# Patient Record
Sex: FEMALE | Race: BLACK OR AFRICAN AMERICAN | NOT HISPANIC OR LATINO | Employment: FULL TIME | URBAN - METROPOLITAN AREA
[De-identification: names, ages, dates, MRNs, and addresses within clinical notes are randomized per-mention and may not be internally consistent; named-entity substitution may affect disease eponyms.]

---

## 2017-03-21 ENCOUNTER — OFFICE VISIT (OUTPATIENT)
Dept: INTERNAL MEDICINE | Facility: CLINIC | Age: 52
End: 2017-03-21
Attending: INTERNAL MEDICINE
Payer: MEDICAID

## 2017-03-21 VITALS
HEIGHT: 62 IN | HEART RATE: 72 BPM | WEIGHT: 170.63 LBS | TEMPERATURE: 98 F | BODY MASS INDEX: 31.4 KG/M2 | SYSTOLIC BLOOD PRESSURE: 133 MMHG | DIASTOLIC BLOOD PRESSURE: 76 MMHG

## 2017-03-21 DIAGNOSIS — R01.1 HEART MURMUR: ICD-10-CM

## 2017-03-21 DIAGNOSIS — Z83.3 FAMILY HISTORY OF DIABETES MELLITUS: ICD-10-CM

## 2017-03-21 DIAGNOSIS — N93.9 VAGINAL SPOTTING: ICD-10-CM

## 2017-03-21 DIAGNOSIS — Z00.00 HEALTHCARE MAINTENANCE: Primary | ICD-10-CM

## 2017-03-21 DIAGNOSIS — R01.1 MURMUR, CARDIAC: ICD-10-CM

## 2017-03-21 PROCEDURE — 99999 PR PBB SHADOW E&M-EST. PATIENT-LVL III: CPT | Mod: PBBFAC,,, | Performed by: STUDENT IN AN ORGANIZED HEALTH CARE EDUCATION/TRAINING PROGRAM

## 2017-03-21 PROCEDURE — 99213 OFFICE O/P EST LOW 20 MIN: CPT | Mod: PBBFAC,PO | Performed by: STUDENT IN AN ORGANIZED HEALTH CARE EDUCATION/TRAINING PROGRAM

## 2017-03-21 PROCEDURE — 99204 OFFICE O/P NEW MOD 45 MIN: CPT | Mod: S$PBB,,, | Performed by: STUDENT IN AN ORGANIZED HEALTH CARE EDUCATION/TRAINING PROGRAM

## 2017-03-21 RX ORDER — HYDROCODONE BITARTRATE AND ACETAMINOPHEN 5; 325 MG/1; MG/1
1 TABLET ORAL EVERY 6 HOURS PRN
COMMUNITY
End: 2018-04-19

## 2017-03-21 RX ORDER — TRAMADOL HYDROCHLORIDE 50 MG/1
50 TABLET ORAL EVERY 6 HOURS PRN
COMMUNITY
End: 2018-04-19

## 2017-03-21 RX ORDER — ALPRAZOLAM 0.5 MG/1
0.5 TABLET ORAL DAILY PRN
COMMUNITY
End: 2018-04-19

## 2017-03-22 ENCOUNTER — LAB VISIT (OUTPATIENT)
Dept: LAB | Facility: HOSPITAL | Age: 52
End: 2017-03-22
Attending: INTERNAL MEDICINE
Payer: MEDICAID

## 2017-03-22 DIAGNOSIS — Z00.00 HEALTHCARE MAINTENANCE: ICD-10-CM

## 2017-03-22 LAB
CHOLEST/HDLC SERPL: 2.8 {RATIO}
HDL/CHOLESTEROL RATIO: 36.1 %
HDLC SERPL-MCNC: 202 MG/DL
HDLC SERPL-MCNC: 73 MG/DL
LDLC SERPL CALC-MCNC: 107.6 MG/DL
NONHDLC SERPL-MCNC: 129 MG/DL
TRIGL SERPL-MCNC: 107 MG/DL

## 2017-03-22 PROCEDURE — 36415 COLL VENOUS BLD VENIPUNCTURE: CPT

## 2017-03-22 PROCEDURE — 80061 LIPID PANEL: CPT

## 2017-03-23 ENCOUNTER — OFFICE VISIT (OUTPATIENT)
Dept: OBSTETRICS AND GYNECOLOGY | Facility: CLINIC | Age: 52
End: 2017-03-23
Payer: MEDICAID

## 2017-03-23 ENCOUNTER — HOSPITAL ENCOUNTER (OUTPATIENT)
Dept: RADIOLOGY | Facility: OTHER | Age: 52
Discharge: HOME | End: 2017-03-23
Attending: OBSTETRICS & GYNECOLOGY
Payer: MEDICAID

## 2017-03-23 VITALS
SYSTOLIC BLOOD PRESSURE: 140 MMHG | WEIGHT: 167.56 LBS | DIASTOLIC BLOOD PRESSURE: 80 MMHG | BODY MASS INDEX: 30.83 KG/M2 | HEIGHT: 62 IN

## 2017-03-23 DIAGNOSIS — Z01.419 ROUTINE GYNECOLOGICAL EXAMINATION: Primary | ICD-10-CM

## 2017-03-23 DIAGNOSIS — Z01.419 ROUTINE GYNECOLOGICAL EXAMINATION: ICD-10-CM

## 2017-03-23 DIAGNOSIS — N93.9 VAGINA BLEEDING: ICD-10-CM

## 2017-03-23 DIAGNOSIS — N94.10 DYSPAREUNIA, FEMALE: ICD-10-CM

## 2017-03-23 DIAGNOSIS — Z12.31 VISIT FOR SCREENING MAMMOGRAM: ICD-10-CM

## 2017-03-23 DIAGNOSIS — N95.1 MENOPAUSE SYNDROME: ICD-10-CM

## 2017-03-23 DIAGNOSIS — R10.2 PELVIC PAIN IN FEMALE: ICD-10-CM

## 2017-03-23 PROCEDURE — 77067 SCR MAMMO BI INCL CAD: CPT | Mod: TC

## 2017-03-23 PROCEDURE — 99386 PREV VISIT NEW AGE 40-64: CPT | Mod: S$PBB,,, | Performed by: OBSTETRICS & GYNECOLOGY

## 2017-03-23 PROCEDURE — 77063 BREAST TOMOSYNTHESIS BI: CPT | Mod: 26,,, | Performed by: RADIOLOGY

## 2017-03-23 PROCEDURE — 99999 PR PBB SHADOW E&M-EST. PATIENT-LVL III: CPT | Mod: PBBFAC,,, | Performed by: OBSTETRICS & GYNECOLOGY

## 2017-03-23 PROCEDURE — 99213 OFFICE O/P EST LOW 20 MIN: CPT | Mod: PBBFAC | Performed by: OBSTETRICS & GYNECOLOGY

## 2017-03-23 PROCEDURE — 77067 SCR MAMMO BI INCL CAD: CPT | Mod: 26,,, | Performed by: RADIOLOGY

## 2017-03-23 RX ORDER — ESTRADIOL 0.1 MG/G
1 CREAM VAGINAL DAILY
Qty: 45 G | Refills: 12 | Status: SHIPPED | OUTPATIENT
Start: 2017-03-23 | End: 2018-04-19

## 2017-03-27 ENCOUNTER — HOSPITAL ENCOUNTER (OUTPATIENT)
Dept: RADIOLOGY | Facility: OTHER | Age: 52
Discharge: HOME | End: 2017-03-27
Attending: OBSTETRICS & GYNECOLOGY
Payer: MEDICAID

## 2017-03-27 DIAGNOSIS — R10.2 PELVIC PAIN IN FEMALE: ICD-10-CM

## 2017-03-27 PROCEDURE — 76830 TRANSVAGINAL US NON-OB: CPT | Mod: 26,,, | Performed by: RADIOLOGY

## 2017-03-27 PROCEDURE — 76856 US EXAM PELVIC COMPLETE: CPT | Mod: 26,,, | Performed by: RADIOLOGY

## 2017-03-27 PROCEDURE — 76856 US EXAM PELVIC COMPLETE: CPT | Mod: TC

## 2017-03-27 NOTE — PROGRESS NOTES
03/27/17 U/S  Findings: There are no abnormal masses, fluid collections or blood flow within the pelvis in this patient status post total hysterectomy.           Impression              This examination is normal.            PT HERE FOR ANNUAL.  CLAIMS TO HAVE HAD VAGINAL BLEEDING S/P HYSTERECTOMY.  PAIN WITH INTERCOURSE AND VAGINAL DRYNESS.    ROS:  GENERAL: No fever, chills, fatigability or weight loss.  VULVAR: No pain, no lesions and no itching.  VAGINAL: No relaxation, no itching, no discharge, no abnormal bleeding and no lesions.  ABDOMEN: No abdominal pain. Denies nausea. Denies vomiting. No diarrhea. No constipation  BREAST: Denies pain. No lumps. No discharge.  URINARY: No incontinence, no nocturia, no frequency and no dysuria.  CARDIOVASCULAR: No chest pain. No shortness of breath. No leg cramps.  NEUROLOGICAL: No headaches. No vision changes.  The remainder of the review of systems was negative.    PE:   General Appearance: overweight Well developed. Well nourished. In no acute distress.  Urethral Meatus: Normal size. Normal location. No lesions. No prolapse.  Vulva: Atrophic. Lesions: No.  Urethra: No masses. No tenderness. No prolapse. No scarring.  Bladder: No masses. No tenderness.  Vagina: Mucosa NI: yes Discharge: no Atrophic: yes Rectocele: no Cystocele: no Vaginal cuff intact: yes  Cervix: Absent.  Uterus: Absent.  Adnexa: Masses: No Tenderness: No       CDS Nodularity: No   Abdomen: overweight  No masses. No tenderness.  Breasts: No bilateral masses. No bilateral discharge. No bilateral tenderness. No bilateral fibrocystic changes.  Neck: No thyroid enlargement. No thyroid masses.  Skin: Rashes: No    PROCEDURES:    DIAGNOSIS:  1. Routine gynecological examination    2. Menopause syndrome    3. Vagina bleeding    4. Pelvic pain in female    5. Dyspareunia, female        PLAN:     MEDICATIONS & ORDERS:  Orders Placed This Encounter    US Pelvis Comp with Transvag NON-OB (xpd    estradiol  (ESTRACE) 0.01 % (0.1 mg/gram) vaginal cream    estrogens, conjugated, (PREMARIN) 0.625 MG tablet       Patient was counseled today on the new ACS guidelines for cervical cytology screening as well as the current recommendations for breast cancer screening. She was counseled to follow up with her PCP for other routine health maintenance. Counseling session lasted approximately 10 minutes, and all her questions were answered.         FOLLOW-UP: With me in 12 month

## 2017-03-30 DIAGNOSIS — Z86.010 ENCOUNTER FOR COLONOSCOPY DUE TO HISTORY OF COLONIC POLYP: Primary | ICD-10-CM

## 2017-03-30 DIAGNOSIS — Z12.11 ENCOUNTER FOR COLONOSCOPY DUE TO HISTORY OF COLONIC POLYP: Primary | ICD-10-CM

## 2017-03-30 RX ORDER — POLYETHYLENE GLYCOL 3350, SODIUM SULFATE ANHYDROUS, SODIUM BICARBONATE, SODIUM CHLORIDE, POTASSIUM CHLORIDE 236; 22.74; 6.74; 5.86; 2.97 G/4L; G/4L; G/4L; G/4L; G/4L
4 POWDER, FOR SOLUTION ORAL ONCE
Qty: 4000 ML | Refills: 0 | Status: SHIPPED | OUTPATIENT
Start: 2017-03-30 | End: 2017-03-30

## 2017-05-02 ENCOUNTER — TELEPHONE (OUTPATIENT)
Dept: INTERNAL MEDICINE | Facility: CLINIC | Age: 52
End: 2017-05-02

## 2017-05-02 NOTE — TELEPHONE ENCOUNTER
I tried to call Ms. Desean Poole many times to inform he about the results of her lipid profile. No answer. I left a message in her voice mail stated that her lipid profile is comparable to the previous one 8 years ago. I calculated her ASCVD for the next 10 years for cardiovascular events and it was 2.2%. No need to start her on statin for the time being. Will re-evaluate the life style advice provided to her in the last visit and re measure her lipid profile based on screening guidelines.

## 2017-05-10 ENCOUNTER — TELEPHONE (OUTPATIENT)
Dept: INTERNAL MEDICINE | Facility: CLINIC | Age: 52
End: 2017-05-10

## 2017-06-12 ENCOUNTER — TELEPHONE (OUTPATIENT)
Dept: ENDOSCOPY | Facility: HOSPITAL | Age: 52
End: 2017-06-12

## 2017-06-26 PROBLEM — Z00.00 HEALTHCARE MAINTENANCE: Status: RESOLVED | Noted: 2017-03-21 | Resolved: 2017-06-26

## 2018-04-19 ENCOUNTER — HOSPITAL ENCOUNTER (EMERGENCY)
Facility: HOSPITAL | Age: 53
End: 2018-04-20
Attending: EMERGENCY MEDICINE | Admitting: COLON & RECTAL SURGERY
Payer: MEDICAID

## 2018-04-19 DIAGNOSIS — K62.89 RECTAL PAIN: Primary | ICD-10-CM

## 2018-04-19 DIAGNOSIS — K64.9 HEMORRHOIDS, UNSPECIFIED HEMORRHOID TYPE: ICD-10-CM

## 2018-04-19 DIAGNOSIS — K60.2 ANAL FISSURE: ICD-10-CM

## 2018-04-19 LAB
ALBUMIN SERPL BCP-MCNC: 3.9 G/DL
ALP SERPL-CCNC: 111 U/L
ALT SERPL W/O P-5'-P-CCNC: 19 U/L
ANION GAP SERPL CALC-SCNC: 9 MMOL/L
AST SERPL-CCNC: 16 U/L
BASOPHILS # BLD AUTO: 0.02 K/UL
BASOPHILS NFR BLD: 0.3 %
BILIRUB SERPL-MCNC: 0.4 MG/DL
BILIRUB UR QL STRIP: NEGATIVE
BUN SERPL-MCNC: 19 MG/DL
CALCIUM SERPL-MCNC: 9.7 MG/DL
CHLORIDE SERPL-SCNC: 100 MMOL/L
CLARITY UR: CLEAR
CO2 SERPL-SCNC: 28 MMOL/L
COLOR UR: YELLOW
CREAT SERPL-MCNC: 1.1 MG/DL
CRP SERPL-MCNC: 7.6 MG/L
DIFFERENTIAL METHOD: ABNORMAL
EOSINOPHIL # BLD AUTO: 0.1 K/UL
EOSINOPHIL NFR BLD: 2.1 %
ERYTHROCYTE [DISTWIDTH] IN BLOOD BY AUTOMATED COUNT: 11.8 %
ERYTHROCYTE [SEDIMENTATION RATE] IN BLOOD BY WESTERGREN METHOD: 39 MM/HR
EST. GFR  (AFRICAN AMERICAN): >60 ML/MIN/1.73 M^2
EST. GFR  (NON AFRICAN AMERICAN): 57 ML/MIN/1.73 M^2
GLUCOSE SERPL-MCNC: 102 MG/DL
GLUCOSE UR QL STRIP: NEGATIVE
HCT VFR BLD AUTO: 38 %
HGB BLD-MCNC: 12.6 G/DL
HGB UR QL STRIP: ABNORMAL
KETONES UR QL STRIP: NEGATIVE
LEUKOCYTE ESTERASE UR QL STRIP: NEGATIVE
LYMPHOCYTES # BLD AUTO: 2.8 K/UL
LYMPHOCYTES NFR BLD: 41.3 %
MCH RBC QN AUTO: 31.7 PG
MCHC RBC AUTO-ENTMCNC: 33.2 G/DL
MCV RBC AUTO: 96 FL
MONOCYTES # BLD AUTO: 0.4 K/UL
MONOCYTES NFR BLD: 5.2 %
NEUTROPHILS # BLD AUTO: 3.4 K/UL
NEUTROPHILS NFR BLD: 51 %
NITRITE UR QL STRIP: NEGATIVE
PH UR STRIP: 6 [PH] (ref 5–8)
PLATELET # BLD AUTO: 238 K/UL
PMV BLD AUTO: 10.5 FL
POTASSIUM SERPL-SCNC: 3.9 MMOL/L
PROT SERPL-MCNC: 7.8 G/DL
PROT UR QL STRIP: NEGATIVE
RBC # BLD AUTO: 3.97 M/UL
SODIUM SERPL-SCNC: 137 MMOL/L
SP GR UR STRIP: 1.01 (ref 1–1.03)
URN SPEC COLLECT METH UR: ABNORMAL
UROBILINOGEN UR STRIP-ACNC: ABNORMAL EU/DL
WBC # BLD AUTO: 6.7 K/UL

## 2018-04-19 PROCEDURE — 25500020 PHARM REV CODE 255: Performed by: EMERGENCY MEDICINE

## 2018-04-19 PROCEDURE — 80053 COMPREHEN METABOLIC PANEL: CPT

## 2018-04-19 PROCEDURE — 85025 COMPLETE CBC W/AUTO DIFF WBC: CPT

## 2018-04-19 PROCEDURE — 63600175 PHARM REV CODE 636 W HCPCS: Performed by: PHYSICIAN ASSISTANT

## 2018-04-19 PROCEDURE — 81003 URINALYSIS AUTO W/O SCOPE: CPT

## 2018-04-19 PROCEDURE — 85651 RBC SED RATE NONAUTOMATED: CPT

## 2018-04-19 PROCEDURE — 86140 C-REACTIVE PROTEIN: CPT

## 2018-04-19 RX ORDER — VENLAFAXINE 75 MG/1
75 TABLET ORAL 2 TIMES DAILY
COMMUNITY

## 2018-04-19 RX ORDER — LISINOPRIL AND HYDROCHLOROTHIAZIDE 20; 25 MG/1; MG/1
1 TABLET ORAL DAILY
COMMUNITY

## 2018-04-19 RX ORDER — MORPHINE SULFATE 10 MG/ML
4 INJECTION INTRAMUSCULAR; INTRAVENOUS; SUBCUTANEOUS
Status: COMPLETED | OUTPATIENT
Start: 2018-04-19 | End: 2018-04-19

## 2018-04-19 RX ORDER — TRAZODONE HYDROCHLORIDE 50 MG/1
50 TABLET ORAL NIGHTLY
COMMUNITY

## 2018-04-19 RX ADMIN — MORPHINE SULFATE 4 MG: 10 INJECTION INTRAVENOUS at 10:04

## 2018-04-19 RX ADMIN — IOHEXOL 75 ML: 350 INJECTION, SOLUTION INTRAVENOUS at 09:04

## 2018-04-20 VITALS
DIASTOLIC BLOOD PRESSURE: 51 MMHG | WEIGHT: 160 LBS | HEART RATE: 71 BPM | BODY MASS INDEX: 30.21 KG/M2 | TEMPERATURE: 99 F | HEIGHT: 61 IN | RESPIRATION RATE: 16 BRPM | OXYGEN SATURATION: 100 % | SYSTOLIC BLOOD PRESSURE: 102 MMHG

## 2018-04-20 PROCEDURE — 99284 EMERGENCY DEPT VISIT MOD MDM: CPT | Mod: 25

## 2018-04-20 PROCEDURE — 99284 EMERGENCY DEPT VISIT MOD MDM: CPT | Mod: ,,, | Performed by: EMERGENCY MEDICINE

## 2018-04-20 PROCEDURE — 96376 TX/PRO/DX INJ SAME DRUG ADON: CPT

## 2018-04-20 PROCEDURE — 63600175 PHARM REV CODE 636 W HCPCS: Performed by: EMERGENCY MEDICINE

## 2018-04-20 PROCEDURE — 96374 THER/PROPH/DIAG INJ IV PUSH: CPT

## 2018-04-20 RX ORDER — MORPHINE SULFATE 4 MG/ML
4 INJECTION, SOLUTION INTRAMUSCULAR; INTRAVENOUS
Status: COMPLETED | OUTPATIENT
Start: 2018-04-20 | End: 2018-04-20

## 2018-04-20 RX ADMIN — MORPHINE SULFATE 4 MG: 4 INJECTION INTRAVENOUS at 02:04

## 2018-04-20 NOTE — ED PROVIDER NOTES
Encounter Date: 2018       History     Chief Complaint   Patient presents with    Hemorrhoids     Pt c/o hemorrhoid pain and bleeding x 3 weeks     Patient is a 52-year-old female with history of hypertension who presents to the emergency department with rectal pain.  Patient states over the last 3 weeks she has had a throbbing sensation in her rectum.  She states over the last 2 days the pain has become very severe.  She rates her pain 10 out of 10.  She states the area is burning and throbbing.  She states she cannot sit down.  She states she believes she has hemorrhoids.  She states she had a soft bowel movement today that did worsen her pain.  She denies fevers or chills.  She denies drainage.  She reports blood with wiping her rectum.  She denies nausea or vomiting.  She denies abdominal pain.  She denies diabetes or immunocompromised state.      The history is provided by the patient.     Review of patient's allergies indicates:   Allergen Reactions    Codeine Itching     Can take percocet or vicodin. Pt reports last time she took it she didn't itch.     Past Medical History:   Diagnosis Date    GERD (gastroesophageal reflux disease)     Hypertension      Past Surgical History:   Procedure Laterality Date    BREAST SURGERY      bilateral reduction     SECTION      X 1    CHOLECYSTECTOMY  2015    HYSTERECTOMY      JESÚS     Family History   Problem Relation Age of Onset    Heart attack Mother     Diabetes Mother     Colon cancer Mother     Stroke Father     Hypertension Father     Colon cancer Father     Hypertension Sister     Ovarian cancer Cousin     Breast cancer Neg Hx      Social History   Substance Use Topics    Smoking status: Never Smoker    Smokeless tobacco: Never Used    Alcohol use 1.2 oz/week     2 Cans of beer per week     Review of Systems   Constitutional: Negative for activity change, appetite change, chills and fever.   HENT: Negative for congestion, ear  discharge, ear pain, postnasal drip, rhinorrhea, sore throat and trouble swallowing.    Respiratory: Negative for cough and shortness of breath.    Cardiovascular: Negative for chest pain.   Gastrointestinal: Positive for anal bleeding and rectal pain. Negative for abdominal pain, blood in stool, constipation, diarrhea, nausea and vomiting.   Genitourinary: Negative for dysuria, flank pain and hematuria.   Musculoskeletal: Negative for back pain, neck pain and neck stiffness.   Skin: Negative for rash and wound.   Neurological: Negative for dizziness, syncope, speech difficulty, weakness, light-headedness, numbness and headaches.       Physical Exam     Initial Vitals [04/19/18 1828]   BP Pulse Resp Temp SpO2   112/67 86 18 97.2 °F (36.2 °C) 99 %      MAP       82         Physical Exam    Nursing note and vitals reviewed.  Constitutional: She appears well-developed and well-nourished. She is not diaphoretic.  Non-toxic appearance. No distress.   Crying in pain   HENT:   Head: Normocephalic.   Right Ear: Hearing and external ear normal.   Left Ear: Hearing and external ear normal.   Nose: Nose normal.   Mouth/Throat: Uvula is midline, oropharynx is clear and moist and mucous membranes are normal. No oropharyngeal exudate.   Eyes: Conjunctivae are normal. Pupils are equal, round, and reactive to light.   Neck: Normal range of motion.   Cardiovascular: Normal rate and regular rhythm.   Pulmonary/Chest: Breath sounds normal.   Abdominal: Soft. Bowel sounds are normal. There is no tenderness. There is no rebound, no CVA tenderness, no tenderness at McBurney's point and negative Tamayo's sign.   Genitourinary: Rectal exam shows external hemorrhoid (not thrombosed), fissure and guaiac positive stool. Rectal exam shows no mass. Guaiac positive stool. : Acceptable.  Genitourinary Comments: Brown stool in vault.  Rectal pain on exam with no induration or fluctuance.  No drainage.   Lymphadenopathy:     She  has no cervical adenopathy.   Neurological: She is alert and oriented to person, place, and time.   Skin: Skin is warm and dry. Capillary refill takes less than 2 seconds.   Psychiatric: She has a normal mood and affect.         ED Course   Procedures  Labs Reviewed   CBC W/ AUTO DIFFERENTIAL - Abnormal; Notable for the following:        Result Value    RBC 3.97 (*)     MCH 31.7 (*)     All other components within normal limits   COMPREHENSIVE METABOLIC PANEL - Abnormal; Notable for the following:     eGFR if non  57 (*)     All other components within normal limits   C-REACTIVE PROTEIN   SEDIMENTATION RATE, MANUAL   URINALYSIS         Imaging Results          CT Pelvis With Contrast (Final result)  Result time 04/19/18 21:42:07    Final result by Tino Rae MD (04/19/18 21:42:07)                 Impression:      1. Thickening of the perianal walls, for the most part low attenuating, may reflect edema, no focal organized collection to suggest abscess.  Correlation with physical exam recommended.  2. Several additional findings above.      Electronically signed by: Tino Rae MD  Date:    04/19/2018  Time:    21:42             Narrative:    EXAMINATION:  CT PELVIS WITH  CONTRAST    CLINICAL HISTORY:  rectal pain;    TECHNIQUE:  Axial images of the pelvis were obtained at 2.5 mm intervals following administration of 75 cc omni 350 IV contrast.  Coronal, sagittal, and delayed images were reviewed.    COMPARISON:  None    FINDINGS:  The right kidney enhances appropriately and excrete contrast appropriately.  The bilateral ureters are grossly unremarkable without calculi seen.  The urinary bladder is unremarkable.  The uterus is absent the adnexa is grossly unremarkable.  There are scattered colonic diverticula without inflammation to suggest diverticulitis.  The appendix and terminal ileum are grossly unremarkable.  The small bowel is grossly unremarkable.  No focal organized pelvic fluid  collection.  No acute osseous abnormality noting degenerative changes of the lumbar spine.    There is mild thickening/edema of the anal walls, without significant surrounding induration or focal organized fluid collection.  No rectal wall thickening.  No subcutaneous organized collection.  No significant inguinal lymphadenopathy.  Postsurgical changes are noted of the anterior pelvic wall.                                     Medical Decision Making:   Initial Assessment:   Urgent evaluation of a 52-year-old female with history of hypertension who presents to the emergency department with rectal pain.  Patient is afebrile, nontoxic appearing, and hemodynamically stable.  Patient is crying on exam.  There is a small external hemorrhoid that is nonthrombosed.  Patient has small fissure.  On rectal exam, patient is an excruciating pain.  She is crying.  There is no induration, fluctuance, or rectal mass noted.  There is brown stool in rectal vault that is guaiac positive.  I am concerned for rectal mass/abscess.  CT scan will be obtained.  Labs will be obtained.  Patient will be given pain control.  Clinical Tests:   Lab Tests: Ordered and Reviewed  Radiological Study: Ordered and Reviewed  ED Management:  10:19 PM  Labs reveal no leukocytosis.  No significant elevation in ESR or CRP.  CT scan shows findings of thickening of the perianal walls which reflects edema with no focal organized collection.  Case is discussed with GI who advised IV antibiotics and transfer to [facility] for colorectal surgery.  Have spoken with transfer center.    10:33 PM  Pt will be transferred ED to ED for colorectal eval.  He advised no IV antibiotics at this time.  Other:   I have discussed this case with another health care provider.       <> Summary of the Discussion: Dr. Horne                      Clinical Impression:   The primary encounter diagnosis was Rectal pain. A diagnosis of Hemorrhoids, unspecified hemorrhoid type was also  pertinent to this visit.                           Keshia Reyes PA-C  04/19/18 [id number]

## 2018-04-20 NOTE — ED NOTES
Spoke with Kassandra at [facility], accepting physician is Shahida, report to be called ED to ED @ [phone]     Transport secured by [facility]

## 2018-04-20 NOTE — ED PROVIDER NOTES
Encounter Date: 2018       History     Chief Complaint   Patient presents with    Transfer - [facility]     Pt transferred from [facility] for evaluation of rectal pain and swelling.      52-year-old female with a history of GERD presents to the emergency department with complaints of rectal pain.  The patient was transferred from [facility] emergency department to receive an evaluation by colorectal surgery for acute rectal pain.  Patient reports that the pain began 2 weeks prior to arrival in worsened throughout that time.  Pain as a 10 out of 10 intensity, nonradiating, exacerbated with direct palpation, minimally alleviated with rest and is throbbing in character.          Review of patient's allergies indicates:   Allergen Reactions    Codeine Itching     Can take percocet or vicodin. Pt reports last time she took it she didn't itch.     Past Medical History:   Diagnosis Date    GERD (gastroesophageal reflux disease)     Hypertension      Past Surgical History:   Procedure Laterality Date    BREAST SURGERY      bilateral reduction     SECTION      X 1    CHOLECYSTECTOMY  2015    HYSTERECTOMY      JESÚS     Family History   Problem Relation Age of Onset    Heart attack Mother     Diabetes Mother     Colon cancer Mother     Stroke Father     Hypertension Father     Colon cancer Father     Hypertension Sister     Ovarian cancer Cousin     Breast cancer Neg Hx      Social History   Substance Use Topics    Smoking status: Never Smoker    Smokeless tobacco: Never Used    Alcohol use 1.2 oz/week     2 Cans of beer per week     Review of Systems   Constitutional: Negative for activity change and appetite change.   HENT: Negative for facial swelling.    Eyes: Negative for pain.   Respiratory: Negative for shortness of breath.    Cardiovascular: Negative for chest pain.   Gastrointestinal: Positive for anal bleeding, diarrhea and rectal pain.   Genitourinary: Negative for dysuria.    Musculoskeletal: Negative for arthralgias and back pain.   Skin: Negative for color change.   Neurological: Negative for dizziness and numbness.   Psychiatric/Behavioral: Negative for confusion.   All other systems reviewed and are negative.      Physical Exam     Initial Vitals [04/19/18 1828]   BP Pulse Resp Temp SpO2   112/67 86 18 97.2 °F (36.2 °C) 99 %      MAP       82         Physical Exam    Constitutional: Vital signs are normal. She appears well-developed and well-nourished.   HENT:   Head: Normocephalic and atraumatic.   Eyes: Conjunctivae, EOM and lids are normal. Pupils are equal, round, and reactive to light.   Neck: Normal range of motion. Neck supple.   Cardiovascular: Normal rate, regular rhythm, S1 normal, S2 normal, normal heart sounds, intact distal pulses and normal pulses.   Pulmonary/Chest: Breath sounds normal.   Abdominal: Soft. Normal appearance and bowel sounds are normal.   Genitourinary:   Genitourinary Comments: Tenderness surrounding the rectal region  Anal fissure present.  No signs of gross blood.   Musculoskeletal: Normal range of motion.   Neurological: She is alert and oriented to person, place, and time. No cranial nerve deficit or sensory deficit.   Skin: Skin is warm and dry. Capillary refill takes less than 2 seconds.   Psychiatric: She has a normal mood and affect. Her speech is normal and behavior is normal. Judgment normal.       HO2    The pt is a 52-year-old female transferred for rectal pain   DDx includes but is not limited to anal fissure, constipation, internal hemorrhoid, external hemorrhoid, diverticulitis     Basic labs, pain medication, CT of the abdomen and pelvis with contrast were ordered prior to the patient's arrival.  CT scan show mild inflammation possibly consistent with diverticulitis.  Once the patient arrived consult it the colorectal excepting service and administered an additional dose of medication for pain.  Vitals are stable and patient was alert  and oriented without any focal deficits.  Kingsley Johns M.D  3:46 AM    HO2 UPDATE    Colorectal surgery evaluated the patient at bedside and confirmed a diagnosis of anal fissure.  Is instructed to continue sitz bath, prescribed a topical calcium channel blocker and instructed to follow up in clinic within the following 2-3 weeks.  Patient was also given strict return precautions prior to discharge.  I will stable mental status remained unchanged.  Kingsley Johns MD  4:30 AM 4/20/2018      ED Course   Procedures  Labs Reviewed   CBC W/ AUTO DIFFERENTIAL - Abnormal; Notable for the following:        Result Value    RBC 3.97 (*)     MCH 31.7 (*)     All other components within normal limits   COMPREHENSIVE METABOLIC PANEL - Abnormal; Notable for the following:     eGFR if non  57 (*)     All other components within normal limits   SEDIMENTATION RATE, MANUAL - Abnormal; Notable for the following:     Sed Rate 39 (*)     All other components within normal limits   URINALYSIS - Abnormal; Notable for the following:     Occult Blood UA Trace (*)     Urobilinogen, UA 2.0-3.0 (*)     All other components within normal limits   C-REACTIVE PROTEIN                                  Clinical Impression:   The primary encounter diagnosis was Rectal pain. Diagnoses of Hemorrhoids, unspecified hemorrhoid type and Anal fissure were also pertinent to this visit.                           Kingsley Johns MD  Resident  04/20/18 [id number]

## 2018-04-20 NOTE — ED NOTES
NEURO: Pt AAO x 4. Behavior and speech appropriate to situation.   CARDIAC: Regular rhythm auscultated  RESPIRATORY: Respirations even and unlabored.    MUSCULOSKELETAL: Active ROM noted to extremities  GASTRO: + tender to palpation

## 2018-04-20 NOTE — CONSULTS
History & Physical  Surgery      SUBJECTIVE:     Chief Complaint/Reason for Admission: anal fissure    History of Present Illness: Desean Poole is a 52 y.o. female with a history of hypertension who presents to the emergency department with rectal pain.  Patient states over the last 3 weeks she has had a throbbing sensation in her rectum.  She states over the last 2 days the pain has become very severe.  She rates her pain 10 out of 10.  She states the area is burning and throbbing.  She states she cannot sit down.  She states she had a soft bowel movement today that did worsen her pain.  She denies fevers or chills.  She denies drainage.  She reports blood with wiping her rectum.  She denies nausea or vomiting.  She denies abdominal pain.  CT pelvis showed mild anal edema but no other abnormalities.      No current facility-administered medications on file prior to encounter.      No current outpatient prescriptions on file prior to encounter.       Review of patient's allergies indicates:   Allergen Reactions    Codeine Itching     Can take percocet or vicodin. Pt reports last time she took it she didn't itch.       Past Medical History:   Diagnosis Date    GERD (gastroesophageal reflux disease)     Hypertension      Past Surgical History:   Procedure Laterality Date    BREAST SURGERY      bilateral reduction     SECTION      X 1    CHOLECYSTECTOMY  2015    HYSTERECTOMY      JESÚS     Family History   Problem Relation Age of Onset    Heart attack Mother     Diabetes Mother     Colon cancer Mother     Stroke Father     Hypertension Father     Colon cancer Father     Hypertension Sister     Ovarian cancer Cousin     Breast cancer Neg Hx      Social History   Substance Use Topics    Smoking status: Never Smoker    Smokeless tobacco: Never Used    Alcohol use 1.2 oz/week     2 Cans of beer per week        Review of Systems   Constitutional: Negative for chills, fatigue and fever.   HENT:  Negative for congestion and rhinorrhea.    Eyes: Negative for visual disturbance.   Respiratory: Negative for cough and shortness of breath.    Cardiovascular: Negative for chest pain and leg swelling.   Gastrointestinal: Positive for anal bleeding. Negative for abdominal pain, constipation, diarrhea, nausea and vomiting.        Anal pain   Endocrine: Negative for polyuria.   Genitourinary: Negative for dysuria.   Musculoskeletal: Negative for arthralgias and myalgias.   Skin: Negative for wound.   Neurological: Negative for dizziness, light-headedness and headaches.   Psychiatric/Behavioral: Negative for agitation.     OBJECTIVE:     Vital Signs (Most Recent)  Temp: 98.5 °F (36.9 °C) (04/20/18 0115)  Pulse: 72 (04/20/18 0231)  Resp: 16 (04/20/18 0115)  BP: (!) 109/57 (04/20/18 0231)  SpO2: 100 % (04/20/18 0231)    Physical Exam   Constitutional: She is oriented to person, place, and time. She appears well-developed and well-nourished. No distress.   HENT:   Head: Normocephalic and atraumatic.   Eyes: Conjunctivae and EOM are normal. Pupils are equal, round, and reactive to light.   Neck: Normal range of motion. Neck supple. No tracheal deviation present.   Cardiovascular: Normal rate, regular rhythm and normal heart sounds.    Pulmonary/Chest: Effort normal and breath sounds normal.   Abdominal: Soft. Bowel sounds are normal.   Genitourinary:   Genitourinary Comments: Posterior midline anal fissure adjacent to perianal skin tag.  MISA/anoscopy deferred due to pain.   Musculoskeletal: Normal range of motion. She exhibits no edema.   Neurological: She is alert and oriented to person, place, and time.   Skin: Skin is warm and dry.   Psychiatric: She has a normal mood and affect.     Laboratory  CBC:   Recent Labs  Lab 04/19/18 2036   WBC 6.70   RBC 3.97*   HGB 12.6   HCT 38.0      MCV 96   MCH 31.7*   MCHC 33.2     CMP:   Recent Labs  Lab 04/19/18 2036      CALCIUM 9.7   ALBUMIN 3.9   PROT 7.8       K 3.9   CO2 28      BUN 19   CREATININE 1.1   ALKPHOS 111   ALT 19   AST 16   BILITOT 0.4       Diagnostic Results:  CT: Reviewed    ASSESSMENT/PLAN:     A/P:  51 yo F with posterior midline anal fissure    1. SOAK TID in tub warm water only (or hand held shower to anus)  2. Diltiazem cream TID  3.  High fiber diet - 25 grams per day.  Emphasis on whole grain breads such as double fiber wheat bread and high fiber cereals and bars.    Drink 8 glasses of water per day 64 - 96 oz per day  Fiber supplements such as KONSYL, METAMUCIL can be taken 1-2 x per day  Regular exercise - 30 min brisk walking 5 days per week  4.  RTC 3 weeks.    5.  Begin miralax daily prn    Jose Frausto  General Surgery, PGY-5  Pager # [phone]

## 2018-04-20 NOTE — ED TRIAGE NOTES
"Pt presents with hemorrhoid pain, onset 3 weeks ago. Pt states "Bleeding, stinging, burning, cant sit, cant walk". Pain rates 10/10. Pt reports "gobs of blood" with stooling, pt reports stools are brown with bright red blood. Pt also reports RUQ pain. Preparation H with and without aloe and anusol without relief. Pt reports she has colonoscopy on 24th. Pt sits with leaning to one side. Pt in NAD.   "

## 2019-11-09 ENCOUNTER — HOSPITAL ENCOUNTER (EMERGENCY)
Facility: OTHER | Age: 54
Discharge: HOME | End: 2019-11-09
Attending: EMERGENCY MEDICINE
Payer: MEDICAID

## 2019-11-09 VITALS
HEART RATE: 67 BPM | SYSTOLIC BLOOD PRESSURE: 136 MMHG | BODY MASS INDEX: 30.23 KG/M2 | TEMPERATURE: 99 F | RESPIRATION RATE: 16 BRPM | DIASTOLIC BLOOD PRESSURE: 85 MMHG | WEIGHT: 160 LBS | OXYGEN SATURATION: 100 %

## 2019-11-09 DIAGNOSIS — R10.9 RIGHT FLANK PAIN: ICD-10-CM

## 2019-11-09 DIAGNOSIS — R10.9 ABDOMINAL PAIN, UNSPECIFIED ABDOMINAL LOCATION: Primary | ICD-10-CM

## 2019-11-09 LAB
ALBUMIN SERPL BCP-MCNC: 4 G/DL (ref 3.5–5.2)
ALP SERPL-CCNC: 87 U/L (ref 55–135)
ALT SERPL W/O P-5'-P-CCNC: 21 U/L (ref 10–44)
ANION GAP SERPL CALC-SCNC: 11 MMOL/L (ref 8–16)
AST SERPL-CCNC: 19 U/L (ref 10–40)
BASOPHILS # BLD AUTO: 0.04 K/UL (ref 0–0.2)
BASOPHILS NFR BLD: 0.4 % (ref 0–1.9)
BILIRUB SERPL-MCNC: 1 MG/DL (ref 0.1–1)
BILIRUB UR QL STRIP: NEGATIVE
BUN SERPL-MCNC: 13 MG/DL (ref 6–20)
CALCIUM SERPL-MCNC: 9.2 MG/DL (ref 8.7–10.5)
CHLORIDE SERPL-SCNC: 104 MMOL/L (ref 95–110)
CLARITY UR: CLEAR
CO2 SERPL-SCNC: 25 MMOL/L (ref 23–29)
COLOR UR: YELLOW
CREAT SERPL-MCNC: 1 MG/DL (ref 0.5–1.4)
DIFFERENTIAL METHOD: NORMAL
EOSINOPHIL # BLD AUTO: 0.1 K/UL (ref 0–0.5)
EOSINOPHIL NFR BLD: 1 % (ref 0–8)
ERYTHROCYTE [DISTWIDTH] IN BLOOD BY AUTOMATED COUNT: 12.1 % (ref 11.5–14.5)
EST. GFR  (AFRICAN AMERICAN): >60 ML/MIN/1.73 M^2
EST. GFR  (NON AFRICAN AMERICAN): >60 ML/MIN/1.73 M^2
GLUCOSE SERPL-MCNC: 113 MG/DL (ref 70–110)
GLUCOSE UR QL STRIP: NEGATIVE
HCT VFR BLD AUTO: 39 % (ref 37–48.5)
HGB BLD-MCNC: 13.1 G/DL (ref 12–16)
HGB UR QL STRIP: ABNORMAL
IMM GRANULOCYTES # BLD AUTO: 0.04 K/UL (ref 0–0.04)
IMM GRANULOCYTES NFR BLD AUTO: 0.4 % (ref 0–0.5)
KETONES UR QL STRIP: NEGATIVE
LEUKOCYTE ESTERASE UR QL STRIP: NEGATIVE
LIPASE SERPL-CCNC: 18 U/L (ref 4–60)
LYMPHOCYTES # BLD AUTO: 2.9 K/UL (ref 1–4.8)
LYMPHOCYTES NFR BLD: 30.4 % (ref 18–48)
MCH RBC QN AUTO: 31 PG (ref 27–31)
MCHC RBC AUTO-ENTMCNC: 33.6 G/DL (ref 32–36)
MCV RBC AUTO: 92 FL (ref 82–98)
MONOCYTES # BLD AUTO: 0.6 K/UL (ref 0.3–1)
MONOCYTES NFR BLD: 6.7 % (ref 4–15)
NEUTROPHILS # BLD AUTO: 5.8 K/UL (ref 1.8–7.7)
NEUTROPHILS NFR BLD: 61.1 % (ref 38–73)
NITRITE UR QL STRIP: NEGATIVE
NRBC BLD-RTO: 0 /100 WBC
PH UR STRIP: 7 [PH] (ref 5–8)
PLATELET # BLD AUTO: 230 K/UL (ref 150–350)
PMV BLD AUTO: 10.8 FL (ref 9.2–12.9)
POTASSIUM SERPL-SCNC: 3.9 MMOL/L (ref 3.5–5.1)
PROT SERPL-MCNC: 7.5 G/DL (ref 6–8.4)
PROT UR QL STRIP: NEGATIVE
RBC # BLD AUTO: 4.22 M/UL (ref 4–5.4)
SODIUM SERPL-SCNC: 140 MMOL/L (ref 136–145)
SP GR UR STRIP: 1.02 (ref 1–1.03)
URN SPEC COLLECT METH UR: ABNORMAL
UROBILINOGEN UR STRIP-ACNC: NEGATIVE EU/DL
WBC # BLD AUTO: 9.42 K/UL (ref 3.9–12.7)

## 2019-11-09 PROCEDURE — 99284 EMERGENCY DEPT VISIT MOD MDM: CPT | Mod: 25

## 2019-11-09 PROCEDURE — 85025 COMPLETE CBC W/AUTO DIFF WBC: CPT

## 2019-11-09 PROCEDURE — 96361 HYDRATE IV INFUSION ADD-ON: CPT

## 2019-11-09 PROCEDURE — 83690 ASSAY OF LIPASE: CPT

## 2019-11-09 PROCEDURE — 80053 COMPREHEN METABOLIC PANEL: CPT

## 2019-11-09 PROCEDURE — 81003 URINALYSIS AUTO W/O SCOPE: CPT

## 2019-11-09 PROCEDURE — 63600175 PHARM REV CODE 636 W HCPCS: Performed by: PHYSICIAN ASSISTANT

## 2019-11-09 PROCEDURE — 96374 THER/PROPH/DIAG INJ IV PUSH: CPT

## 2019-11-09 RX ORDER — KETOROLAC TROMETHAMINE 30 MG/ML
10 INJECTION, SOLUTION INTRAMUSCULAR; INTRAVENOUS
Status: COMPLETED | OUTPATIENT
Start: 2019-11-09 | End: 2019-11-09

## 2019-11-09 RX ORDER — NAPROXEN 500 MG/1
500 TABLET ORAL 2 TIMES DAILY WITH MEALS
Qty: 20 TABLET | Refills: 0 | Status: SHIPPED | OUTPATIENT
Start: 2019-11-09 | End: 2022-04-02

## 2019-11-09 RX ADMIN — SODIUM CHLORIDE 1000 ML: 0.9 INJECTION, SOLUTION INTRAVENOUS at 03:11

## 2019-11-09 RX ADMIN — KETOROLAC TROMETHAMINE 10 MG: 30 INJECTION, SOLUTION INTRAMUSCULAR; INTRAVENOUS at 03:11

## 2019-11-09 NOTE — ED PROVIDER NOTES
Encounter Date: 2019       History     Chief Complaint   Patient presents with    Abdominal Pain     right-sided abd pain x 2 days. Denies N/V/D or urinary s/s. Pt does admit episode of feeling faint yesterday     Urgent evaluation a 54-year-old female presenting to the emergency room with complaints of right upper quadrant abdominal pain and flank pain.  Patient states her symptoms started last night and have been constant since.  She denies any associated nausea, vomiting, diarrhea a dysuria.  No fever chills. No medication use thus far.  She states that she has had similar symptoms in the past, they are concerned had some ED with her liver, no known liver problems.  She is not a daily drinker.  No drug use.This is the extent of the patient's complaints at this time.       The history is provided by the patient.     Review of patient's allergies indicates:   Allergen Reactions    Codeine Itching     Can take percocet or vicodin. Pt reports last time she took it she didn't itch.     Past Medical History:   Diagnosis Date    GERD (gastroesophageal reflux disease)     Hypertension      Past Surgical History:   Procedure Laterality Date    BREAST SURGERY      bilateral reduction     SECTION      X 1    CHOLECYSTECTOMY  2015    HYSTERECTOMY      JESÚS     Family History   Problem Relation Age of Onset    Heart attack Mother     Diabetes Mother     Colon cancer Mother     Stroke Father     Hypertension Father     Colon cancer Father     Hypertension Sister     Ovarian cancer Cousin     Breast cancer Neg Hx      Social History     Tobacco Use    Smoking status: Never Smoker    Smokeless tobacco: Never Used   Substance Use Topics    Alcohol use: Yes     Alcohol/week: 2.0 standard drinks     Types: 2 Cans of beer per week    Drug use: No     Review of Systems   Constitutional: Negative for activity change, appetite change, chills, fatigue and fever.   HENT: Negative for congestion,  rhinorrhea and sore throat.    Eyes: Negative for photophobia and visual disturbance.   Respiratory: Negative for cough, shortness of breath and wheezing.    Cardiovascular: Negative for chest pain.   Gastrointestinal: Positive for abdominal pain. Negative for diarrhea, nausea and vomiting.   Genitourinary: Positive for flank pain. Negative for dysuria, hematuria and urgency.   Musculoskeletal: Negative for back pain, myalgias and neck pain.   Skin: Negative for color change and wound.   Neurological: Negative for weakness and headaches.   Psychiatric/Behavioral: Negative for agitation and confusion.       Physical Exam     Initial Vitals [11/09/19 1452]   BP Pulse Resp Temp SpO2   (!) 143/65 77 16 98.6 °F (37 °C) 99 %      MAP       --         Physical Exam    Nursing note and vitals reviewed.  Constitutional: She appears well-developed and well-nourished. She is not diaphoretic. No distress.   Well-appearing,  female accompanied the emergency room.  Speaking clearly full sentences.  No acute distress.   HENT:   Head: Normocephalic and atraumatic.   Right Ear: External ear normal.   Left Ear: External ear normal.   Nose: Nose normal.   Mouth/Throat: Oropharynx is clear and moist.   Eyes: Conjunctivae and EOM are normal.   Neck: Normal range of motion. Neck supple.   Cardiovascular: Normal rate, regular rhythm and normal heart sounds.   Pulmonary/Chest: Breath sounds normal. No respiratory distress. She has no wheezes.   Abdominal: Soft. Bowel sounds are normal. She exhibits no distension. There is tenderness in the right upper quadrant. There is CVA tenderness (Right-sided). There is no rebound and no guarding.   Musculoskeletal: Normal range of motion.   Neurological: She is alert and oriented to person, place, and time. GCS eye subscore is 4. GCS verbal subscore is 5. GCS motor subscore is 6.   Skin: Skin is warm.   Psychiatric: She has a normal mood and affect. Her behavior is normal. Judgment  and thought content normal.         ED Course   Procedures  Labs Reviewed   COMPREHENSIVE METABOLIC PANEL - Abnormal; Notable for the following components:       Result Value    Glucose 113 (*)     All other components within normal limits   URINALYSIS, REFLEX TO URINE CULTURE - Abnormal; Notable for the following components:    Occult Blood UA Trace (*)     All other components within normal limits    Narrative:     Preferred Collection Type->Urine, Clean Catch   CBC W/ AUTO DIFFERENTIAL   LIPASE          Imaging Results          CT Renal Stone Study ABD Pelvis WO (Final result)  Result time 11/09/19 18:06:23    Final result by Tequila Camacho MD (11/09/19 18:06:23)                 Impression:      1. No acute intra-abdominal abnormalities identified.  No evidence of stones or obstructive uropathy.  2. Postsurgical changes of cholecystectomy and hysterectomy.      Electronically signed by: Tequila Camacho MD  Date:    11/09/2019  Time:    18:06             Narrative:    EXAMINATION:  CT RENAL STONE STUDY ABD PELVIS WO    CLINICAL HISTORY:  Hematuria;    TECHNIQUE:  Low dose axial images, sagittal and coronal reformations were obtained from the lung bases to the pubic symphysis.  Contrast was not administered.    COMPARISON:  None    FINDINGS:  The visualized portion of the heart is unremarkable.  The lung bases are clear.    No significant hepatic abnormality seen on this noncontrast exam.  There is no intra-or extrahepatic biliary ductal dilatation.  Gallbladder has been removed.  The stomach, pancreas, spleen, and adrenal glands are unremarkable.    Kidneys show no evidence of stones or hydronephrosis. Ureters are difficult to track, however no stones are seen along their expected courses.  Urinary bladder is nondistended.  Uterus has been removed.    Appendix is visualized and is unremarkable.  The visualized loops of small and large bowel show no evidence of obstruction or inflammation.  No free air or free  fluid.    Aorta tapers normally.    No acute osseous abnormality identified. Subcutaneous soft tissue structures are unremarkable.                                 Medical Decision Making:   Initial Assessment:     Urgent evaluation of a 54 y.o. female with a past medical history of HTN, presenting to the emergency department complaining of right sided abdominal pain and flank pain. Patient is afebrile, nontoxic appearing and hemodynamically stable. Physical exam reveals mild tenderness to palpation right upper quadrant right flank with no rebound or guarding.  Will plan for labs, analgesics and reassess.    Clinical Tests:   Lab Tests: Ordered and Reviewed  ED Management:    CBC shows no leukocytosis, normal H&H.  CMP is unremarkable, normal electrolytes, urine creatinine, LFTs.  Lipase is normal.  UA is unremarkable for hematuria.  Will plan for CT renal stone.    CT is unremarkable.  No clear etiology for the patient's signs or symptoms.  At this point, no evidence of acute bacterial infection require more testing or antibiotic treatment.  Patient be treated with naproxen, counseled on home care and treatment.  Discharged stable condition.The patient was instructed to follow up with a primary care provider in 2 days or to return to the emergency department for worsening symptoms. The treatment plan was discussed with the patient who demonstrated understanding and comfort with plan.    This note was created using [employer] Direct. There may be typographical errors secondary to dictation.                                    Clinical Impression:     1. Abdominal pain, unspecified abdominal location    2. Right flank pain        Disposition:   Disposition: Discharged  Condition: Stable                     Joy Oconnor PA-C  11/09/19 1910

## 2019-11-09 NOTE — ED TRIAGE NOTES
Pt to ER with c/o right lower abdominal pain radiating towards the back x 2 days. Pt denies any UA symptoms, or trauma. Pt denies CP, weakness, SOB, N/V

## 2019-11-22 NOTE — PROGRESS NOTES
Subjective:       Patient ID: Desean Poole is a 51 y.o. female.    Chief Complaint: Establish Care    HPI     This is Ms. Desean Poole, 51 y.o., patient of Dr. Heather Otto MD, with last visit of 3/21/17, presented to the primary care clinic to Establish Care. She overall noticed that she has blurred vision, for the last three months, she noticed no change in her reading and not associated with day or night. 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 Blevins prescribed her pain and anxiety (Norco and Xanax) medication since 2016. She noticed some symptosm of lower urinary tracts infection including . She also noticed some dyspnea on exertion with functional class III on more than usual physical activity. Denies orthopnea, PND, chest pain, lower limb swelling, or loss of consciousness. She had colonscopy around 6 years that revealed some polys according to patient (not documented). She endorsed that she was having hypertension previously, and she was taking Clonidine. On this visit, she has normal blood pressure reading that was measured twice 133/76 and my measurement 135-70 mmHg.     Problem List:  Patient Active Problem List   Diagnosis    Disease of gallbladder    Shingles outbreak       Past Medical History:   Past Medical History:   Diagnosis Date    GERD (gastroesophageal reflux disease)     Hypertension         Past Surgical History:   Past Surgical History:   Procedure Laterality Date    BREAST SURGERY      bilateral reduction     SECTION      HYSTERECTOMY         Medication History:  Current Outpatient Prescriptions on File Prior to Visit   Medication Sig Dispense Refill    [DISCONTINUED] acyclovir (ZOVIRAX) 200 MG capsule Take 1 capsule (200 mg total) by mouth 5 (five) times daily. 150 capsule 11    [DISCONTINUED] cloNIDine (CATAPRES) 0.1 MG tablet Take 0.05 mg by mouth every  evening.       [DISCONTINUED] omeprazole (PRILOSEC) 40 MG capsule Take 40 mg by mouth daily as needed.      [DISCONTINUED] ondansetron (ZOFRAN ODT) 4 MG TbDL Take 1 tablet (4 mg total) by mouth every 8 (eight) hours as needed (Nausea). 12 tablet 0    [DISCONTINUED] oxycodone (ROXICODONE) 10 mg Tab immediate release tablet Take 1 tablet (10 mg total) by mouth every 4 (four) hours as needed. 40 tablet 0    [DISCONTINUED] oxycodone-acetaminophen (PERCOCET) 5-325 mg per tablet Take 1 tablet by mouth every 6 (six) hours as needed for Pain. 12 tablet 0    [DISCONTINUED] oxycodone-acetaminophen (PERCOCET) 7.5-325 mg per tablet Take 1 tablet by mouth every 4 (four) hours as needed for Pain. 20 tablet 0    [DISCONTINUED] silver sulfADIAZINE 1% (SILVADENE) 1 % cream Apply topically 2 (two) times daily. 400 g 1    [DISCONTINUED] zolpidem (AMBIEN) 5 MG Tab Take 1 tablet (5 mg total) by mouth nightly as needed. 10 tablet 0     No current facility-administered medications on file prior to visit.        Allergy:   Review of patient's allergies indicates:   Allergen Reactions    Codeine      Can take percocet or vicodin       Social Hx:  Social History   Substance Use Topics    Smoking status: Never Smoker    Smokeless tobacco: Never Used    Alcohol use 1.2 oz/week     2 Cans of beer per week       Family Hx:  Family History   Problem Relation Age of Onset    Heart attack Mother     Diabetes Mother     Stroke Father     Hypertension Father     Hypertension Sister           Review of Systems   Constitutional: Positive for fatigue. Negative for activity change, appetite change, chills, diaphoresis and fever.   HENT: Negative for congestion, dental problem, drooling, ear discharge and ear pain.    Eyes: Negative for pain, discharge and itching.   Respiratory: Positive for shortness of breath (Class III NYHA). Negative for cough, choking, chest tightness, wheezing and stridor.    Cardiovascular: Negative for chest pain,  palpitations and leg swelling.   Gastrointestinal: Negative for abdominal distention, abdominal pain, anal bleeding, constipation and diarrhea.   Genitourinary: Negative for difficulty urinating and dysuria.        Vaginal spotting (pinkish)    Musculoskeletal: Negative for arthralgias and back pain.        Right arm numbness and tingling sensation (from her trauma) and following Dr. Blevins for that.    Skin: Negative for color change and pallor.   Neurological: Negative for weakness.   Psychiatric/Behavioral: Negative for agitation and behavioral problems.       Objective:  Vitals:    03/21/17 1424   BP: 133/76   Pulse: 72   Temp: 97.8 °F (36.6 °C)           Physical Exam   Constitutional: She is oriented to person, place, and time. She appears well-developed and well-nourished. No distress.   HENT:   Head: Normocephalic and atraumatic.   Right Ear: External ear normal.   Left Ear: External ear normal.   Eyes: EOM are normal. Pupils are equal, round, and reactive to light. Right eye exhibits no discharge. Left eye exhibits no discharge.   Neck: Normal range of motion. Neck supple. No JVD present. No tracheal deviation present. No thyromegaly present.   Cardiovascular: Normal rate.    Murmur (systolic murmur) heard.  Pulmonary/Chest: Effort normal and breath sounds normal.   Abdominal: Soft. Bowel sounds are normal. She exhibits no distension.   Musculoskeletal: Normal range of motion. She exhibits no edema or deformity.   Neurological: She is alert and oriented to person, place, and time.   Skin: She is not diaphoretic.       Assessment:       1. Healthcare maintenance    2. Vaginal spotting    3. Family history of diabetes mellitus    4. Murmur, cardiac    5. Heart murmur        Plan:         Desean was seen today for establish care.    Diagnoses and all orders for this visit:    Healthcare maintenance  -     CBC auto differential; Standing  -     Comprehensive metabolic panel; Standing  -     LIPID PANEL;  Future  -     CBC auto differential  -     Comprehensive metabolic panel  - Hepatitis C Antibody   -     Case request GI: COLONOSCOPY    Vaginal spotting   -     URINALYSIS   -     Will defer her Mammogram and Pap smear to her ObGyn appointment this week.   -     CBC auto differential; Standing  Family history of diabetes mellitus  -     Hemoglobin A1c; Future    Murmur, cardiac  -     Echo doppler color flow; Future      Ms. Desean Poole was seen, discussed and examined with Staff Primary Care physician Dr. Zacarias. Return to clinic on 3 months.     Josh Scruggs MD  Internal Medicine Resident (PGY-I)  Pager: [phone]     no indicators present

## 2021-01-29 ENCOUNTER — HOSPITAL ENCOUNTER (OUTPATIENT)
Facility: OTHER | Age: 56
Discharge: HOME | End: 2021-01-31
Attending: INTERNAL MEDICINE | Admitting: INTERNAL MEDICINE
Payer: MEDICAID

## 2021-01-29 DIAGNOSIS — R07.9 ACUTE CHEST PAIN: ICD-10-CM

## 2021-01-29 DIAGNOSIS — R07.89 NON-CARDIAC CHEST PAIN: Primary | ICD-10-CM

## 2021-01-29 DIAGNOSIS — I10 ESSENTIAL HYPERTENSION: ICD-10-CM

## 2021-01-29 DIAGNOSIS — N17.9 AKI (ACUTE KIDNEY INJURY): ICD-10-CM

## 2021-01-29 DIAGNOSIS — I10 HYPERTENSION, UNSPECIFIED TYPE: ICD-10-CM

## 2021-01-29 DIAGNOSIS — R07.9 CHEST PAIN: ICD-10-CM

## 2021-01-29 DIAGNOSIS — G43.009 MIGRAINE WITHOUT AURA AND WITHOUT STATUS MIGRAINOSUS, NOT INTRACTABLE: ICD-10-CM

## 2021-01-29 LAB
ALBUMIN SERPL BCP-MCNC: 4.5 G/DL (ref 3.5–5.2)
ALP SERPL-CCNC: 137 U/L (ref 55–135)
ALT SERPL W/O P-5'-P-CCNC: 92 U/L (ref 10–44)
ANION GAP SERPL CALC-SCNC: 12 MMOL/L (ref 8–16)
AST SERPL-CCNC: 29 U/L (ref 10–40)
BASOPHILS # BLD AUTO: 0.04 K/UL (ref 0–0.2)
BASOPHILS NFR BLD: 0.7 % (ref 0–1.9)
BILIRUB SERPL-MCNC: 0.8 MG/DL (ref 0.1–1)
BILIRUB UR QL STRIP: NEGATIVE
BNP SERPL-MCNC: <10 PG/ML (ref 0–99)
BUN SERPL-MCNC: 14 MG/DL (ref 6–20)
CALCIUM SERPL-MCNC: 10.2 MG/DL (ref 8.7–10.5)
CHLORIDE SERPL-SCNC: 94 MMOL/L (ref 95–110)
CLARITY UR: ABNORMAL
CO2 SERPL-SCNC: 29 MMOL/L (ref 23–29)
COLOR UR: YELLOW
CREAT SERPL-MCNC: 1.6 MG/DL (ref 0.5–1.4)
CTP QC/QA: YES
CTP QC/QA: YES
DIFFERENTIAL METHOD: ABNORMAL
EOSINOPHIL # BLD AUTO: 0.3 K/UL (ref 0–0.5)
EOSINOPHIL NFR BLD: 4.2 % (ref 0–8)
ERYTHROCYTE [DISTWIDTH] IN BLOOD BY AUTOMATED COUNT: 12.1 % (ref 11.5–14.5)
EST. GFR  (AFRICAN AMERICAN): 41 ML/MIN/1.73 M^2
EST. GFR  (NON AFRICAN AMERICAN): 36 ML/MIN/1.73 M^2
GLUCOSE SERPL-MCNC: 110 MG/DL (ref 70–110)
GLUCOSE UR QL STRIP: NEGATIVE
HCT VFR BLD AUTO: 43.7 % (ref 37–48.5)
HGB BLD-MCNC: 14.4 G/DL (ref 12–16)
HGB UR QL STRIP: NEGATIVE
IMM GRANULOCYTES # BLD AUTO: 0.03 K/UL (ref 0–0.04)
IMM GRANULOCYTES NFR BLD AUTO: 0.5 % (ref 0–0.5)
KETONES UR QL STRIP: NEGATIVE
LEUKOCYTE ESTERASE UR QL STRIP: NEGATIVE
LIPASE SERPL-CCNC: 29 U/L (ref 4–60)
LYMPHOCYTES # BLD AUTO: 1.5 K/UL (ref 1–4.8)
LYMPHOCYTES NFR BLD: 24.8 % (ref 18–48)
MCH RBC QN AUTO: 31.3 PG (ref 27–31)
MCHC RBC AUTO-ENTMCNC: 33 G/DL (ref 32–36)
MCV RBC AUTO: 95 FL (ref 82–98)
MONOCYTES # BLD AUTO: 0.8 K/UL (ref 0.3–1)
MONOCYTES NFR BLD: 13.2 % (ref 4–15)
NEUTROPHILS # BLD AUTO: 3.3 K/UL (ref 1.8–7.7)
NEUTROPHILS NFR BLD: 56.6 % (ref 38–73)
NITRITE UR QL STRIP: NEGATIVE
NRBC BLD-RTO: 0 /100 WBC
PH UR STRIP: 6 [PH] (ref 5–8)
PLATELET # BLD AUTO: 252 K/UL (ref 150–350)
PLATELET BLD QL SMEAR: ABNORMAL
PMV BLD AUTO: 10.7 FL (ref 9.2–12.9)
POTASSIUM SERPL-SCNC: 3.6 MMOL/L (ref 3.5–5.1)
PROT SERPL-MCNC: 8.7 G/DL (ref 6–8.4)
PROT UR QL STRIP: NEGATIVE
RBC # BLD AUTO: 4.6 M/UL (ref 4–5.4)
SARS-COV-2 RDRP RESP QL NAA+PROBE: NEGATIVE
SARS-COV-2 RDRP RESP QL NAA+PROBE: NEGATIVE
SODIUM SERPL-SCNC: 135 MMOL/L (ref 136–145)
SP GR UR STRIP: 1.02 (ref 1–1.03)
TROPONIN I SERPL DL<=0.01 NG/ML-MCNC: 0.01 NG/ML (ref 0–0.03)
TROPONIN I SERPL DL<=0.01 NG/ML-MCNC: 0.01 NG/ML (ref 0–0.03)
URN SPEC COLLECT METH UR: ABNORMAL
UROBILINOGEN UR STRIP-ACNC: NEGATIVE EU/DL
WBC # BLD AUTO: 5.89 K/UL (ref 3.9–12.7)

## 2021-01-29 PROCEDURE — 93005 ELECTROCARDIOGRAM TRACING: CPT

## 2021-01-29 PROCEDURE — 85025 COMPLETE CBC W/AUTO DIFF WBC: CPT

## 2021-01-29 PROCEDURE — 96372 THER/PROPH/DIAG INJ SC/IM: CPT | Mod: 59

## 2021-01-29 PROCEDURE — 93010 EKG 12-LEAD: ICD-10-PCS | Mod: ,,, | Performed by: INTERNAL MEDICINE

## 2021-01-29 PROCEDURE — U0002 COVID-19 LAB TEST NON-CDC: HCPCS | Performed by: EMERGENCY MEDICINE

## 2021-01-29 PROCEDURE — 99220 PR INITIAL OBSERVATION CARE,LEVL III: CPT | Mod: ,,, | Performed by: NURSE PRACTITIONER

## 2021-01-29 PROCEDURE — 93010 ELECTROCARDIOGRAM REPORT: CPT | Mod: ,,, | Performed by: INTERNAL MEDICINE

## 2021-01-29 PROCEDURE — G0378 HOSPITAL OBSERVATION PER HR: HCPCS

## 2021-01-29 PROCEDURE — 63600175 PHARM REV CODE 636 W HCPCS: Performed by: NURSE PRACTITIONER

## 2021-01-29 PROCEDURE — 83880 ASSAY OF NATRIURETIC PEPTIDE: CPT

## 2021-01-29 PROCEDURE — 80053 COMPREHEN METABOLIC PANEL: CPT

## 2021-01-29 PROCEDURE — 99220 PR INITIAL OBSERVATION CARE,LEVL III: ICD-10-PCS | Mod: ,,, | Performed by: NURSE PRACTITIONER

## 2021-01-29 PROCEDURE — 84484 ASSAY OF TROPONIN QUANT: CPT

## 2021-01-29 PROCEDURE — 25000003 PHARM REV CODE 250: Performed by: EMERGENCY MEDICINE

## 2021-01-29 PROCEDURE — 84484 ASSAY OF TROPONIN QUANT: CPT | Mod: 91

## 2021-01-29 PROCEDURE — 96360 HYDRATION IV INFUSION INIT: CPT

## 2021-01-29 PROCEDURE — 25000003 PHARM REV CODE 250: Performed by: NURSE PRACTITIONER

## 2021-01-29 PROCEDURE — 94761 N-INVAS EAR/PLS OXIMETRY MLT: CPT

## 2021-01-29 PROCEDURE — 99285 EMERGENCY DEPT VISIT HI MDM: CPT | Mod: 25

## 2021-01-29 PROCEDURE — 81003 URINALYSIS AUTO W/O SCOPE: CPT

## 2021-01-29 PROCEDURE — U0002 COVID-19 LAB TEST NON-CDC: HCPCS | Performed by: NURSE PRACTITIONER

## 2021-01-29 PROCEDURE — 96361 HYDRATE IV INFUSION ADD-ON: CPT

## 2021-01-29 PROCEDURE — 83690 ASSAY OF LIPASE: CPT

## 2021-01-29 PROCEDURE — 36415 COLL VENOUS BLD VENIPUNCTURE: CPT

## 2021-01-29 RX ORDER — ONDANSETRON 8 MG/1
8 TABLET, ORALLY DISINTEGRATING ORAL EVERY 8 HOURS PRN
Status: DISCONTINUED | OUTPATIENT
Start: 2021-01-29 | End: 2021-01-31 | Stop reason: HOSPADM

## 2021-01-29 RX ORDER — ACETAMINOPHEN 325 MG/1
650 TABLET ORAL EVERY 4 HOURS PRN
Status: DISCONTINUED | OUTPATIENT
Start: 2021-01-29 | End: 2021-01-31 | Stop reason: HOSPADM

## 2021-01-29 RX ORDER — VENLAFAXINE 37.5 MG/1
75 TABLET ORAL 2 TIMES DAILY
Status: DISCONTINUED | OUTPATIENT
Start: 2021-01-29 | End: 2021-01-31 | Stop reason: HOSPADM

## 2021-01-29 RX ORDER — SODIUM CHLORIDE 9 MG/ML
INJECTION, SOLUTION INTRAVENOUS CONTINUOUS
Status: DISCONTINUED | OUTPATIENT
Start: 2021-01-29 | End: 2021-01-29

## 2021-01-29 RX ORDER — ASPIRIN 325 MG
325 TABLET ORAL
Status: COMPLETED | OUTPATIENT
Start: 2021-01-29 | End: 2021-01-29

## 2021-01-29 RX ORDER — ACETAMINOPHEN 500 MG
1000 TABLET ORAL
Status: COMPLETED | OUTPATIENT
Start: 2021-01-29 | End: 2021-01-29

## 2021-01-29 RX ORDER — SODIUM CHLORIDE 0.9 % (FLUSH) 0.9 %
10 SYRINGE (ML) INJECTION
Status: DISCONTINUED | OUTPATIENT
Start: 2021-01-29 | End: 2021-01-31 | Stop reason: HOSPADM

## 2021-01-29 RX ORDER — SODIUM CHLORIDE 9 MG/ML
INJECTION, SOLUTION INTRAVENOUS CONTINUOUS
Status: DISCONTINUED | OUTPATIENT
Start: 2021-01-29 | End: 2021-01-30

## 2021-01-29 RX ORDER — TALC
6 POWDER (GRAM) TOPICAL NIGHTLY PRN
Status: CANCELLED | OUTPATIENT
Start: 2021-01-29

## 2021-01-29 RX ORDER — SODIUM CHLORIDE 0.9 % (FLUSH) 0.9 %
10 SYRINGE (ML) INJECTION
Status: DISCONTINUED | OUTPATIENT
Start: 2021-01-29 | End: 2021-01-30

## 2021-01-29 RX ORDER — HEPARIN SODIUM 5000 [USP'U]/ML
5000 INJECTION, SOLUTION INTRAVENOUS; SUBCUTANEOUS EVERY 8 HOURS
Status: DISCONTINUED | OUTPATIENT
Start: 2021-01-29 | End: 2021-01-30

## 2021-01-29 RX ORDER — TRAZODONE HYDROCHLORIDE 50 MG/1
50 TABLET ORAL NIGHTLY
Status: DISCONTINUED | OUTPATIENT
Start: 2021-01-29 | End: 2021-01-31 | Stop reason: HOSPADM

## 2021-01-29 RX ADMIN — HEPARIN SODIUM 5000 UNITS: 5000 INJECTION INTRAVENOUS; SUBCUTANEOUS at 10:01

## 2021-01-29 RX ADMIN — SODIUM CHLORIDE: 0.9 INJECTION, SOLUTION INTRAVENOUS at 08:01

## 2021-01-29 RX ADMIN — VENLAFAXINE 75 MG: 37.5 TABLET ORAL at 08:01

## 2021-01-29 RX ADMIN — ASPIRIN 325 MG ORAL TABLET 325 MG: 325 PILL ORAL at 05:01

## 2021-01-29 RX ADMIN — ACETAMINOPHEN 1000 MG: 500 TABLET ORAL at 05:01

## 2021-01-29 RX ADMIN — TRAZODONE HYDROCHLORIDE 50 MG: 50 TABLET ORAL at 08:01

## 2021-01-30 ENCOUNTER — HOSPITAL ENCOUNTER (OUTPATIENT)
Dept: CARDIOLOGY | Facility: OTHER | Age: 56
Discharge: HOME | End: 2021-01-30
Attending: INTERNAL MEDICINE | Admitting: INTERNAL MEDICINE
Payer: MEDICAID

## 2021-01-30 VITALS
DIASTOLIC BLOOD PRESSURE: 56 MMHG | HEIGHT: 61 IN | WEIGHT: 169 LBS | HEART RATE: 73 BPM | SYSTOLIC BLOOD PRESSURE: 109 MMHG | BODY MASS INDEX: 31.91 KG/M2

## 2021-01-30 PROBLEM — G43.009 MIGRAINE WITHOUT AURA AND WITHOUT STATUS MIGRAINOSUS, NOT INTRACTABLE: Status: ACTIVE | Noted: 2021-01-30

## 2021-01-30 PROBLEM — R07.89 NON-CARDIAC CHEST PAIN: Status: ACTIVE | Noted: 2021-01-29

## 2021-01-30 LAB
ALBUMIN SERPL BCP-MCNC: 3.8 G/DL (ref 3.5–5.2)
ALP SERPL-CCNC: 121 U/L (ref 55–135)
ALT SERPL W/O P-5'-P-CCNC: 72 U/L (ref 10–44)
ANION GAP SERPL CALC-SCNC: 12 MMOL/L (ref 8–16)
AORTIC ROOT ANNULUS: 2.3 CM
ASCENDING AORTA: 2.36 CM
AST SERPL-CCNC: 25 U/L (ref 10–40)
AV PEAK GRADIENT: 9 MMHG
AV VELOCITY RATIO: 1.01
BASOPHILS # BLD AUTO: 0.04 K/UL (ref 0–0.2)
BASOPHILS NFR BLD: 0.7 % (ref 0–1.9)
BILIRUB SERPL-MCNC: 0.7 MG/DL (ref 0.1–1)
BSA FOR ECHO PROCEDURE: 1.82 M2
BUN SERPL-MCNC: 15 MG/DL (ref 6–20)
CALCIUM SERPL-MCNC: 9.4 MG/DL (ref 8.7–10.5)
CHLORIDE SERPL-SCNC: 97 MMOL/L (ref 95–110)
CO2 SERPL-SCNC: 24 MMOL/L (ref 23–29)
CREAT SERPL-MCNC: 1 MG/DL (ref 0.5–1.4)
CV ECHO LV RWT: 0.37 CM
DIFFERENTIAL METHOD: ABNORMAL
DOP CALC AO PEAK VEL: 1.48 M/S
DOP CALC LVOT AREA: 3 CM2
DOP CALC LVOT DIAMETER: 1.96 CM
DOP CALC LVOT PEAK VEL: 1.5 M/S
DOP CALC LVOT STROKE VOLUME: 86.28 CM3
DOP CALCLVOT PEAK VEL VTI: 28.61 CM
E WAVE DECELERATION TIME: 200.62 MSEC
E/A RATIO: 1.84
E/E' RATIO: 11.79 M/S
ECHO LV POSTERIOR WALL: 0.77 CM (ref 0.6–1.1)
EOSINOPHIL # BLD AUTO: 0.4 K/UL (ref 0–0.5)
EOSINOPHIL NFR BLD: 7.8 % (ref 0–8)
ERYTHROCYTE [DISTWIDTH] IN BLOOD BY AUTOMATED COUNT: 11.9 % (ref 11.5–14.5)
EST. GFR  (AFRICAN AMERICAN): >60 ML/MIN/1.73 M^2
EST. GFR  (NON AFRICAN AMERICAN): >60 ML/MIN/1.73 M^2
FRACTIONAL SHORTENING: 46 % (ref 28–44)
GLUCOSE SERPL-MCNC: 113 MG/DL (ref 70–110)
HCT VFR BLD AUTO: 40.4 % (ref 37–48.5)
HGB BLD-MCNC: 13.7 G/DL (ref 12–16)
IMM GRANULOCYTES # BLD AUTO: 0.03 K/UL (ref 0–0.04)
IMM GRANULOCYTES NFR BLD AUTO: 0.5 % (ref 0–0.5)
INTERVENTRICULAR SEPTUM: 0.79 CM (ref 0.6–1.1)
IVRT: 57.09 MSEC
LA MAJOR: 4.32 CM
LA MINOR: 4.11 CM
LA WIDTH: 3.88 CM
LEFT ATRIUM SIZE: 3.05 CM
LEFT ATRIUM VOLUME INDEX: 24.1 ML/M2
LEFT ATRIUM VOLUME: 42.37 CM3
LEFT INTERNAL DIMENSION IN SYSTOLE: 2.24 CM (ref 2.1–4)
LEFT VENTRICLE DIASTOLIC VOLUME INDEX: 42.68 ML/M2
LEFT VENTRICLE DIASTOLIC VOLUME: 75.12 ML
LEFT VENTRICLE MASS INDEX: 54 G/M2
LEFT VENTRICLE SYSTOLIC VOLUME INDEX: 9.6 ML/M2
LEFT VENTRICLE SYSTOLIC VOLUME: 16.98 ML
LEFT VENTRICULAR INTERNAL DIMENSION IN DIASTOLE: 4.12 CM (ref 3.5–6)
LEFT VENTRICULAR MASS: 94.88 G
LV LATERAL E/E' RATIO: 9.33 M/S
LV SEPTAL E/E' RATIO: 16 M/S
LYMPHOCYTES # BLD AUTO: 1.7 K/UL (ref 1–4.8)
LYMPHOCYTES NFR BLD: 30.3 % (ref 18–48)
MAGNESIUM SERPL-MCNC: 2.6 MG/DL (ref 1.6–2.6)
MCH RBC QN AUTO: 31.6 PG (ref 27–31)
MCHC RBC AUTO-ENTMCNC: 33.9 G/DL (ref 32–36)
MCV RBC AUTO: 93 FL (ref 82–98)
MONOCYTES # BLD AUTO: 0.7 K/UL (ref 0.3–1)
MONOCYTES NFR BLD: 12.2 % (ref 4–15)
MV A" WAVE DURATION": 10.85 MSEC
MV PEAK A VEL: 0.61 M/S
MV PEAK E VEL: 1.12 M/S
MV STENOSIS PRESSURE HALF TIME: 58 MS
MV VALVE AREA P 1/2 METHOD: 3.79 CM2
NEUTROPHILS # BLD AUTO: 2.7 K/UL (ref 1.8–7.7)
NEUTROPHILS NFR BLD: 48.5 % (ref 38–73)
NRBC BLD-RTO: 0 /100 WBC
PHOSPHATE SERPL-MCNC: 4.6 MG/DL (ref 2.7–4.5)
PISA TR MAX VEL: 1.25 M/S
PLATELET # BLD AUTO: 233 K/UL (ref 150–350)
PLATELET BLD QL SMEAR: ABNORMAL
PMV BLD AUTO: 11 FL (ref 9.2–12.9)
POTASSIUM SERPL-SCNC: 3.9 MMOL/L (ref 3.5–5.1)
PROT SERPL-MCNC: 7.7 G/DL (ref 6–8.4)
PULM VEIN S/D RATIO: 1.33
PV PEAK D VEL: 0.42 M/S
PV PEAK S VEL: 0.56 M/S
PV PEAK VELOCITY: 1.05 CM/S
RA MAJOR: 3.95 CM
RA PRESSURE: 3 MMHG
RA WIDTH: 2.67 CM
RBC # BLD AUTO: 4.34 M/UL (ref 4–5.4)
RIGHT VENTRICULAR END-DIASTOLIC DIMENSION: 3.03 CM
RV TISSUE DOPPLER FREE WALL SYSTOLIC VELOCITY 1 (APICAL 4 CHAMBER VIEW): 12.61 CM/S
SINUS: 2.32 CM
SODIUM SERPL-SCNC: 133 MMOL/L (ref 136–145)
STJ: 2.19 CM
TDI LATERAL: 0.12 M/S
TDI SEPTAL: 0.07 M/S
TDI: 0.1 M/S
TR MAX PG: 6 MMHG
TRICUSPID ANNULAR PLANE SYSTOLIC EXCURSION: 2.22 CM
TROPONIN I SERPL DL<=0.01 NG/ML-MCNC: 0.01 NG/ML (ref 0–0.03)
TROPONIN I SERPL DL<=0.01 NG/ML-MCNC: 0.02 NG/ML (ref 0–0.03)
TV REST PULMONARY ARTERY PRESSURE: 9 MMHG
WBC # BLD AUTO: 5.65 K/UL (ref 3.9–12.7)

## 2021-01-30 PROCEDURE — 63600175 PHARM REV CODE 636 W HCPCS: Performed by: NURSE PRACTITIONER

## 2021-01-30 PROCEDURE — 96361 HYDRATE IV INFUSION ADD-ON: CPT

## 2021-01-30 PROCEDURE — 99226 PR SUBSEQUENT OBSERVATION CARE,LEVEL III: ICD-10-PCS | Mod: ,,, | Performed by: NURSE PRACTITIONER

## 2021-01-30 PROCEDURE — 25000003 PHARM REV CODE 250: Performed by: NURSE PRACTITIONER

## 2021-01-30 PROCEDURE — 96372 THER/PROPH/DIAG INJ SC/IM: CPT

## 2021-01-30 PROCEDURE — 93306 TTE W/DOPPLER COMPLETE: CPT | Mod: 26,,, | Performed by: INTERNAL MEDICINE

## 2021-01-30 PROCEDURE — 85025 COMPLETE CBC W/AUTO DIFF WBC: CPT

## 2021-01-30 PROCEDURE — 84100 ASSAY OF PHOSPHORUS: CPT

## 2021-01-30 PROCEDURE — 94761 N-INVAS EAR/PLS OXIMETRY MLT: CPT

## 2021-01-30 PROCEDURE — 80053 COMPREHEN METABOLIC PANEL: CPT

## 2021-01-30 PROCEDURE — 93306 ECHO (CUPID ONLY): ICD-10-PCS | Mod: 26,,, | Performed by: INTERNAL MEDICINE

## 2021-01-30 PROCEDURE — 84484 ASSAY OF TROPONIN QUANT: CPT

## 2021-01-30 PROCEDURE — 99226 PR SUBSEQUENT OBSERVATION CARE,LEVEL III: CPT | Mod: ,,, | Performed by: NURSE PRACTITIONER

## 2021-01-30 PROCEDURE — 36415 COLL VENOUS BLD VENIPUNCTURE: CPT

## 2021-01-30 PROCEDURE — 25000003 PHARM REV CODE 250: Performed by: EMERGENCY MEDICINE

## 2021-01-30 PROCEDURE — 83735 ASSAY OF MAGNESIUM: CPT

## 2021-01-30 PROCEDURE — 93306 TTE W/DOPPLER COMPLETE: CPT

## 2021-01-30 PROCEDURE — G0378 HOSPITAL OBSERVATION PER HR: HCPCS

## 2021-01-30 RX ORDER — LISINOPRIL AND HYDROCHLOROTHIAZIDE 20; 25 MG/1; MG/1
1 TABLET ORAL DAILY
Status: DISCONTINUED | OUTPATIENT
Start: 2021-01-30 | End: 2021-01-30

## 2021-01-30 RX ORDER — LISINOPRIL 20 MG/1
20 TABLET ORAL DAILY
Status: DISCONTINUED | OUTPATIENT
Start: 2021-01-30 | End: 2021-01-30

## 2021-01-30 RX ORDER — HYDROCHLOROTHIAZIDE 25 MG/1
25 TABLET ORAL DAILY
Status: DISCONTINUED | OUTPATIENT
Start: 2021-01-30 | End: 2021-01-30

## 2021-01-30 RX ORDER — BUTALBITAL, ACETAMINOPHEN AND CAFFEINE 50; 325; 40 MG/1; MG/1; MG/1
1 TABLET ORAL EVERY 4 HOURS PRN
Status: DISCONTINUED | OUTPATIENT
Start: 2021-01-30 | End: 2021-01-31 | Stop reason: HOSPADM

## 2021-01-30 RX ORDER — ENOXAPARIN SODIUM 100 MG/ML
40 INJECTION SUBCUTANEOUS EVERY 24 HOURS
Status: DISCONTINUED | OUTPATIENT
Start: 2021-01-30 | End: 2021-01-31 | Stop reason: HOSPADM

## 2021-01-30 RX ADMIN — VENLAFAXINE 75 MG: 37.5 TABLET ORAL at 09:01

## 2021-01-30 RX ADMIN — ACETAMINOPHEN 650 MG: 325 TABLET, FILM COATED ORAL at 05:01

## 2021-01-30 RX ADMIN — VENLAFAXINE 75 MG: 37.5 TABLET ORAL at 08:01

## 2021-01-30 RX ADMIN — TRAZODONE HYDROCHLORIDE 50 MG: 50 TABLET ORAL at 08:01

## 2021-01-30 RX ADMIN — SODIUM CHLORIDE: 0.9 INJECTION, SOLUTION INTRAVENOUS at 07:01

## 2021-01-30 RX ADMIN — HEPARIN SODIUM 5000 UNITS: 5000 INJECTION INTRAVENOUS; SUBCUTANEOUS at 05:01

## 2021-01-30 RX ADMIN — BUTALBITAL, ACETAMINOPHEN, AND CAFFEINE 1 TABLET: 50; 325; 40 TABLET ORAL at 01:01

## 2021-01-30 RX ADMIN — BUTALBITAL, ACETAMINOPHEN, AND CAFFEINE 1 TABLET: 50; 325; 40 TABLET ORAL at 08:01

## 2021-01-30 RX ADMIN — ENOXAPARIN SODIUM 40 MG: 40 INJECTION SUBCUTANEOUS at 04:01

## 2021-01-30 RX ADMIN — ONDANSETRON 8 MG: 8 TABLET, ORALLY DISINTEGRATING ORAL at 11:01

## 2021-01-31 VITALS
HEART RATE: 66 BPM | RESPIRATION RATE: 16 BRPM | SYSTOLIC BLOOD PRESSURE: 110 MMHG | HEIGHT: 61 IN | DIASTOLIC BLOOD PRESSURE: 59 MMHG | TEMPERATURE: 99 F | OXYGEN SATURATION: 96 % | WEIGHT: 169.75 LBS | BODY MASS INDEX: 32.05 KG/M2

## 2021-01-31 PROBLEM — K59.01 SLOW TRANSIT CONSTIPATION: Status: ACTIVE | Noted: 2021-01-31

## 2021-01-31 LAB
ALBUMIN SERPL BCP-MCNC: 3.7 G/DL (ref 3.5–5.2)
ALP SERPL-CCNC: 107 U/L (ref 55–135)
ALT SERPL W/O P-5'-P-CCNC: 59 U/L (ref 10–44)
ANION GAP SERPL CALC-SCNC: 11 MMOL/L (ref 8–16)
AST SERPL-CCNC: 21 U/L (ref 10–40)
BASOPHILS # BLD AUTO: 0.05 K/UL (ref 0–0.2)
BASOPHILS NFR BLD: 0.9 % (ref 0–1.9)
BILIRUB SERPL-MCNC: 0.9 MG/DL (ref 0.1–1)
BUN SERPL-MCNC: 11 MG/DL (ref 6–20)
CALCIUM SERPL-MCNC: 9.2 MG/DL (ref 8.7–10.5)
CHLORIDE SERPL-SCNC: 98 MMOL/L (ref 95–110)
CO2 SERPL-SCNC: 25 MMOL/L (ref 23–29)
CREAT SERPL-MCNC: 0.8 MG/DL (ref 0.5–1.4)
DIFFERENTIAL METHOD: ABNORMAL
EOSINOPHIL # BLD AUTO: 0.5 K/UL (ref 0–0.5)
EOSINOPHIL NFR BLD: 9.5 % (ref 0–8)
ERYTHROCYTE [DISTWIDTH] IN BLOOD BY AUTOMATED COUNT: 11.7 % (ref 11.5–14.5)
EST. GFR  (AFRICAN AMERICAN): >60 ML/MIN/1.73 M^2
EST. GFR  (NON AFRICAN AMERICAN): >60 ML/MIN/1.73 M^2
GLUCOSE SERPL-MCNC: 95 MG/DL (ref 70–110)
HCT VFR BLD AUTO: 38.8 % (ref 37–48.5)
HGB BLD-MCNC: 13.3 G/DL (ref 12–16)
IMM GRANULOCYTES # BLD AUTO: 0.05 K/UL (ref 0–0.04)
IMM GRANULOCYTES NFR BLD AUTO: 0.9 % (ref 0–0.5)
LYMPHOCYTES # BLD AUTO: 2.1 K/UL (ref 1–4.8)
LYMPHOCYTES NFR BLD: 38.1 % (ref 18–48)
MAGNESIUM SERPL-MCNC: 2.2 MG/DL (ref 1.6–2.6)
MCH RBC QN AUTO: 32.2 PG (ref 27–31)
MCHC RBC AUTO-ENTMCNC: 34.3 G/DL (ref 32–36)
MCV RBC AUTO: 94 FL (ref 82–98)
MONOCYTES # BLD AUTO: 0.6 K/UL (ref 0.3–1)
MONOCYTES NFR BLD: 10.6 % (ref 4–15)
NEUTROPHILS # BLD AUTO: 2.2 K/UL (ref 1.8–7.7)
NEUTROPHILS NFR BLD: 40 % (ref 38–73)
NRBC BLD-RTO: 0 /100 WBC
PLATELET # BLD AUTO: 236 K/UL (ref 150–350)
PLATELET BLD QL SMEAR: ABNORMAL
PMV BLD AUTO: 11.3 FL (ref 9.2–12.9)
POTASSIUM SERPL-SCNC: 4.2 MMOL/L (ref 3.5–5.1)
PROT SERPL-MCNC: 7.6 G/DL (ref 6–8.4)
RBC # BLD AUTO: 4.13 M/UL (ref 4–5.4)
SODIUM SERPL-SCNC: 134 MMOL/L (ref 136–145)
WBC # BLD AUTO: 5.59 K/UL (ref 3.9–12.7)

## 2021-01-31 PROCEDURE — 63600175 PHARM REV CODE 636 W HCPCS: Performed by: INTERNAL MEDICINE

## 2021-01-31 PROCEDURE — 25000003 PHARM REV CODE 250: Performed by: NURSE PRACTITIONER

## 2021-01-31 PROCEDURE — 80053 COMPREHEN METABOLIC PANEL: CPT

## 2021-01-31 PROCEDURE — 85025 COMPLETE CBC W/AUTO DIFF WBC: CPT

## 2021-01-31 PROCEDURE — G0378 HOSPITAL OBSERVATION PER HR: HCPCS

## 2021-01-31 PROCEDURE — 94761 N-INVAS EAR/PLS OXIMETRY MLT: CPT

## 2021-01-31 PROCEDURE — 99225 PR SUBSEQUENT OBSERVATION CARE,LEVEL II: CPT | Mod: ,,, | Performed by: NURSE PRACTITIONER

## 2021-01-31 PROCEDURE — 90471 IMMUNIZATION ADMIN: CPT | Performed by: INTERNAL MEDICINE

## 2021-01-31 PROCEDURE — 36415 COLL VENOUS BLD VENIPUNCTURE: CPT

## 2021-01-31 PROCEDURE — 99225 PR SUBSEQUENT OBSERVATION CARE,LEVEL II: ICD-10-PCS | Mod: ,,, | Performed by: NURSE PRACTITIONER

## 2021-01-31 PROCEDURE — 90686 IIV4 VACC NO PRSV 0.5 ML IM: CPT | Performed by: INTERNAL MEDICINE

## 2021-01-31 PROCEDURE — 83735 ASSAY OF MAGNESIUM: CPT

## 2021-01-31 RX ORDER — POLYETHYLENE GLYCOL 3350 17 G/17G
17 POWDER, FOR SOLUTION ORAL DAILY
Status: DISCONTINUED | OUTPATIENT
Start: 2021-01-31 | End: 2021-01-31 | Stop reason: HOSPADM

## 2021-01-31 RX ORDER — BUTALBITAL, ACETAMINOPHEN AND CAFFEINE 50; 325; 40 MG/1; MG/1; MG/1
1 TABLET ORAL EVERY 6 HOURS PRN
Qty: 12 TABLET | Refills: 0 | Status: SHIPPED | OUTPATIENT
Start: 2021-01-31

## 2021-01-31 RX ORDER — POLYETHYLENE GLYCOL 3350 17 G/17G
17 POWDER, FOR SOLUTION ORAL DAILY
Qty: 14 EACH | Refills: 0 | Status: SHIPPED | OUTPATIENT
Start: 2021-01-31

## 2021-01-31 RX ADMIN — VENLAFAXINE 75 MG: 37.5 TABLET ORAL at 08:01

## 2021-01-31 RX ADMIN — BUTALBITAL, ACETAMINOPHEN, AND CAFFEINE 1 TABLET: 50; 325; 40 TABLET ORAL at 08:01

## 2021-01-31 RX ADMIN — POLYETHYLENE GLYCOL 3350 17 G: 17 POWDER, FOR SOLUTION ORAL at 11:01

## 2021-01-31 RX ADMIN — INFLUENZA VIRUS VACCINE 0.5 ML: 15; 15; 15; 15 SUSPENSION INTRAMUSCULAR at 11:01

## 2021-04-16 ENCOUNTER — PATIENT MESSAGE (OUTPATIENT)
Dept: RESEARCH | Facility: HOSPITAL | Age: 56
End: 2021-04-16

## 2022-04-01 ENCOUNTER — HOSPITAL ENCOUNTER (OUTPATIENT)
Facility: OTHER | Age: 57
Discharge: HOME | End: 2022-04-02
Attending: EMERGENCY MEDICINE | Admitting: EMERGENCY MEDICINE
Payer: MEDICARE

## 2022-04-01 DIAGNOSIS — R10.9 ABDOMINAL PAIN, UNSPECIFIED ABDOMINAL LOCATION: ICD-10-CM

## 2022-04-01 DIAGNOSIS — R79.89 ELEVATED LFTS: Primary | ICD-10-CM

## 2022-04-01 DIAGNOSIS — R17 ELEVATED BILIRUBIN: ICD-10-CM

## 2022-04-01 DIAGNOSIS — R07.89 ATYPICAL CHEST PAIN: ICD-10-CM

## 2022-04-01 DIAGNOSIS — R07.9 CHEST PAIN: ICD-10-CM

## 2022-04-01 LAB
ALBUMIN SERPL BCP-MCNC: 3.9 G/DL (ref 3.5–5.2)
ALP SERPL-CCNC: 163 U/L (ref 55–135)
ALT SERPL W/O P-5'-P-CCNC: 261 U/L (ref 10–44)
ANION GAP SERPL CALC-SCNC: 12 MMOL/L (ref 8–16)
APAP SERPL-MCNC: <3 UG/ML (ref 10–20)
AST SERPL-CCNC: 294 U/L (ref 10–40)
BASOPHILS # BLD AUTO: 0.03 K/UL (ref 0–0.2)
BASOPHILS NFR BLD: 0.7 % (ref 0–1.9)
BILIRUB SERPL-MCNC: 3.5 MG/DL (ref 0.1–1)
BNP SERPL-MCNC: 52 PG/ML (ref 0–99)
BUN SERPL-MCNC: 12 MG/DL (ref 6–20)
CALCIUM SERPL-MCNC: 9.7 MG/DL (ref 8.7–10.5)
CHLORIDE SERPL-SCNC: 104 MMOL/L (ref 95–110)
CO2 SERPL-SCNC: 23 MMOL/L (ref 23–29)
CREAT SERPL-MCNC: 0.9 MG/DL (ref 0.5–1.4)
DIFFERENTIAL METHOD: ABNORMAL
EOSINOPHIL # BLD AUTO: 0.2 K/UL (ref 0–0.5)
EOSINOPHIL NFR BLD: 4.3 % (ref 0–8)
ERYTHROCYTE [DISTWIDTH] IN BLOOD BY AUTOMATED COUNT: 12.4 % (ref 11.5–14.5)
EST. GFR  (AFRICAN AMERICAN): >60 ML/MIN/1.73 M^2
EST. GFR  (NON AFRICAN AMERICAN): >60 ML/MIN/1.73 M^2
GLUCOSE SERPL-MCNC: 98 MG/DL (ref 70–110)
HCT VFR BLD AUTO: 41.4 % (ref 37–48.5)
HCV AB SERPL QL IA: NEGATIVE
HGB BLD-MCNC: 14.2 G/DL (ref 12–16)
HIV 1+2 AB+HIV1 P24 AG SERPL QL IA: NEGATIVE
IMM GRANULOCYTES # BLD AUTO: 0.01 K/UL (ref 0–0.04)
IMM GRANULOCYTES NFR BLD AUTO: 0.2 % (ref 0–0.5)
INR PPP: 1 (ref 0.8–1.2)
LIPASE SERPL-CCNC: 20 U/L (ref 4–60)
LYMPHOCYTES # BLD AUTO: 0.9 K/UL (ref 1–4.8)
LYMPHOCYTES NFR BLD: 20.4 % (ref 18–48)
MCH RBC QN AUTO: 33.9 PG (ref 27–31)
MCHC RBC AUTO-ENTMCNC: 34.3 G/DL (ref 32–36)
MCV RBC AUTO: 99 FL (ref 82–98)
MONOCYTES # BLD AUTO: 0.1 K/UL (ref 0.3–1)
MONOCYTES NFR BLD: 2.9 % (ref 4–15)
NEUTROPHILS # BLD AUTO: 3 K/UL (ref 1.8–7.7)
NEUTROPHILS NFR BLD: 71.5 % (ref 38–73)
NRBC BLD-RTO: 0 /100 WBC
PLATELET # BLD AUTO: 202 K/UL (ref 150–450)
PMV BLD AUTO: 10.7 FL (ref 9.2–12.9)
POTASSIUM SERPL-SCNC: 3.6 MMOL/L (ref 3.5–5.1)
PROT SERPL-MCNC: 7.2 G/DL (ref 6–8.4)
PROTHROMBIN TIME: 10.6 SEC (ref 9–12.5)
RBC # BLD AUTO: 4.19 M/UL (ref 4–5.4)
SODIUM SERPL-SCNC: 139 MMOL/L (ref 136–145)
TROPONIN I SERPL DL<=0.01 NG/ML-MCNC: <0.006 NG/ML (ref 0–0.03)
WBC # BLD AUTO: 4.17 K/UL (ref 3.9–12.7)

## 2022-04-01 PROCEDURE — 25000003 PHARM REV CODE 250: Performed by: NURSE PRACTITIONER

## 2022-04-01 PROCEDURE — 63600175 PHARM REV CODE 636 W HCPCS: Performed by: NURSE PRACTITIONER

## 2022-04-01 PROCEDURE — 93005 ELECTROCARDIOGRAM TRACING: CPT

## 2022-04-01 PROCEDURE — 25000242 PHARM REV CODE 250 ALT 637 W/ HCPCS: Performed by: NURSE PRACTITIONER

## 2022-04-01 PROCEDURE — 80143 DRUG ASSAY ACETAMINOPHEN: CPT | Performed by: EMERGENCY MEDICINE

## 2022-04-01 PROCEDURE — 80053 COMPREHEN METABOLIC PANEL: CPT | Performed by: EMERGENCY MEDICINE

## 2022-04-01 PROCEDURE — 85025 COMPLETE CBC W/AUTO DIFF WBC: CPT | Performed by: EMERGENCY MEDICINE

## 2022-04-01 PROCEDURE — 93010 EKG 12-LEAD: ICD-10-PCS | Mod: ,,, | Performed by: INTERNAL MEDICINE

## 2022-04-01 PROCEDURE — 87389 HIV-1 AG W/HIV-1&-2 AB AG IA: CPT | Performed by: EMERGENCY MEDICINE

## 2022-04-01 PROCEDURE — 96376 TX/PRO/DX INJ SAME DRUG ADON: CPT

## 2022-04-01 PROCEDURE — U0002 COVID-19 LAB TEST NON-CDC: HCPCS | Performed by: PHYSICIAN ASSISTANT

## 2022-04-01 PROCEDURE — 84484 ASSAY OF TROPONIN QUANT: CPT | Performed by: EMERGENCY MEDICINE

## 2022-04-01 PROCEDURE — 80074 ACUTE HEPATITIS PANEL: CPT | Performed by: EMERGENCY MEDICINE

## 2022-04-01 PROCEDURE — 96375 TX/PRO/DX INJ NEW DRUG ADDON: CPT

## 2022-04-01 PROCEDURE — 96374 THER/PROPH/DIAG INJ IV PUSH: CPT

## 2022-04-01 PROCEDURE — 85610 PROTHROMBIN TIME: CPT | Performed by: EMERGENCY MEDICINE

## 2022-04-01 PROCEDURE — 63600175 PHARM REV CODE 636 W HCPCS: Performed by: PHYSICIAN ASSISTANT

## 2022-04-01 PROCEDURE — 93010 ELECTROCARDIOGRAM REPORT: CPT | Mod: ,,, | Performed by: INTERNAL MEDICINE

## 2022-04-01 PROCEDURE — 36415 COLL VENOUS BLD VENIPUNCTURE: CPT | Performed by: EMERGENCY MEDICINE

## 2022-04-01 PROCEDURE — G0378 HOSPITAL OBSERVATION PER HR: HCPCS

## 2022-04-01 PROCEDURE — 83690 ASSAY OF LIPASE: CPT | Performed by: EMERGENCY MEDICINE

## 2022-04-01 PROCEDURE — 99285 EMERGENCY DEPT VISIT HI MDM: CPT | Mod: 25

## 2022-04-01 PROCEDURE — 83880 ASSAY OF NATRIURETIC PEPTIDE: CPT | Performed by: EMERGENCY MEDICINE

## 2022-04-01 RX ORDER — ASPIRIN 81 MG/1
81 TABLET ORAL
Status: DISCONTINUED | OUTPATIENT
Start: 2022-04-02 | End: 2022-04-02 | Stop reason: HOSPADM

## 2022-04-01 RX ORDER — ASPIRIN 81 MG/1
81 TABLET ORAL
COMMUNITY

## 2022-04-01 RX ORDER — ONDANSETRON 2 MG/ML
4 INJECTION INTRAMUSCULAR; INTRAVENOUS EVERY 8 HOURS PRN
Status: DISCONTINUED | OUTPATIENT
Start: 2022-04-01 | End: 2022-04-02 | Stop reason: HOSPADM

## 2022-04-01 RX ORDER — MORPHINE SULFATE 4 MG/ML
2 INJECTION, SOLUTION INTRAMUSCULAR; INTRAVENOUS EVERY 4 HOURS PRN
Status: DISCONTINUED | OUTPATIENT
Start: 2022-04-01 | End: 2022-04-02 | Stop reason: HOSPADM

## 2022-04-01 RX ORDER — METOPROLOL SUCCINATE 25 MG/1
25 TABLET, EXTENDED RELEASE ORAL DAILY
COMMUNITY
Start: 2022-03-30

## 2022-04-01 RX ORDER — SODIUM CHLORIDE, SODIUM LACTATE, POTASSIUM CHLORIDE, CALCIUM CHLORIDE 600; 310; 30; 20 MG/100ML; MG/100ML; MG/100ML; MG/100ML
INJECTION, SOLUTION INTRAVENOUS CONTINUOUS
Status: DISCONTINUED | OUTPATIENT
Start: 2022-04-02 | End: 2022-04-02 | Stop reason: HOSPADM

## 2022-04-01 RX ORDER — SODIUM CHLORIDE 0.9 % (FLUSH) 0.9 %
10 SYRINGE (ML) INJECTION
Status: DISCONTINUED | OUTPATIENT
Start: 2022-04-02 | End: 2022-04-02 | Stop reason: HOSPADM

## 2022-04-01 RX ORDER — METOPROLOL SUCCINATE 25 MG/1
25 TABLET, EXTENDED RELEASE ORAL DAILY
Status: DISCONTINUED | OUTPATIENT
Start: 2022-04-02 | End: 2022-04-02 | Stop reason: HOSPADM

## 2022-04-01 RX ORDER — MORPHINE SULFATE 4 MG/ML
4 INJECTION, SOLUTION INTRAMUSCULAR; INTRAVENOUS ONCE
Status: COMPLETED | OUTPATIENT
Start: 2022-04-01 | End: 2022-04-01

## 2022-04-01 RX ORDER — NITROGLYCERIN 0.4 MG/1
0.4 TABLET SUBLINGUAL
Status: COMPLETED | OUTPATIENT
Start: 2022-04-01 | End: 2022-04-01

## 2022-04-01 RX ORDER — VENLAFAXINE 37.5 MG/1
75 TABLET ORAL 2 TIMES DAILY
Status: DISCONTINUED | OUTPATIENT
Start: 2022-04-02 | End: 2022-04-02 | Stop reason: HOSPADM

## 2022-04-01 RX ORDER — TRAZODONE HYDROCHLORIDE 50 MG/1
50 TABLET ORAL NIGHTLY
Status: DISCONTINUED | OUTPATIENT
Start: 2022-04-02 | End: 2022-04-02 | Stop reason: HOSPADM

## 2022-04-01 RX ORDER — POLYETHYLENE GLYCOL 3350 17 G/17G
17 POWDER, FOR SOLUTION ORAL DAILY
Status: DISCONTINUED | OUTPATIENT
Start: 2022-04-02 | End: 2022-04-02 | Stop reason: HOSPADM

## 2022-04-01 RX ORDER — ACETAMINOPHEN 500 MG
1000 TABLET ORAL
Status: COMPLETED | OUTPATIENT
Start: 2022-04-01 | End: 2022-04-01

## 2022-04-01 RX ORDER — HYDROCHLOROTHIAZIDE 25 MG/1
25 TABLET ORAL
Status: COMPLETED | OUTPATIENT
Start: 2022-04-02 | End: 2022-04-02

## 2022-04-01 RX ORDER — ATORVASTATIN CALCIUM 40 MG/1
40 TABLET, FILM COATED ORAL DAILY
COMMUNITY
Start: 2022-03-31

## 2022-04-01 RX ORDER — LISINOPRIL 20 MG/1
20 TABLET ORAL
Status: COMPLETED | OUTPATIENT
Start: 2022-04-02 | End: 2022-04-02

## 2022-04-01 RX ORDER — FAMOTIDINE 10 MG/ML
20 INJECTION INTRAVENOUS EVERY 12 HOURS
Status: DISCONTINUED | OUTPATIENT
Start: 2022-04-02 | End: 2022-04-02 | Stop reason: HOSPADM

## 2022-04-01 RX ADMIN — MORPHINE SULFATE 4 MG: 4 INJECTION, SOLUTION INTRAMUSCULAR; INTRAVENOUS at 01:04

## 2022-04-01 RX ADMIN — LORAZEPAM 1 MG: 2 INJECTION INTRAMUSCULAR; INTRAVENOUS at 04:04

## 2022-04-01 RX ADMIN — MORPHINE SULFATE 2 MG: 4 INJECTION, SOLUTION INTRAMUSCULAR; INTRAVENOUS at 03:04

## 2022-04-01 RX ADMIN — ACETAMINOPHEN 1000 MG: 500 TABLET, FILM COATED ORAL at 12:04

## 2022-04-01 RX ADMIN — MORPHINE SULFATE 2 MG: 4 INJECTION, SOLUTION INTRAMUSCULAR; INTRAVENOUS at 10:04

## 2022-04-01 RX ADMIN — ONDANSETRON 4 MG: 2 INJECTION INTRAMUSCULAR; INTRAVENOUS at 10:04

## 2022-04-01 RX ADMIN — NITROGLYCERIN 0.4 MG: 0.4 TABLET, ORALLY DISINTEGRATING SUBLINGUAL at 11:04

## 2022-04-01 NOTE — ED PROVIDER NOTES
"Source of History:  Patient    Chief complaint:  Chest Pain (Midsternal x 2 days.  States that pain is 10/10 and "stretchy" or "heavy in nature.  PMH  MI)      HPI:  Desean Poole is a 56 y.o. female presenting with complaint of right-sided/midsternal chest pain that began 2 days ago, she reports that is intermittent in nature but has been more persistent over the last few hours.  Described as a heaviness, pain is 10/10.  Last night pain was also in her epigastric region.  She denies any injury/trauma.  Patient reports had an abnormal stress test early last year and underwent a angiogram in April of last year.  Currently on metoprolol and lisinopril.  She also reports some exertional shortness of breath associated with the chest pain.    This is the extent to the patients complaints today here in the emergency department.    ROS: As per HPI and below:  Constitutional: No fever.  No chills.  Eyes: No visual changes.  ENT: No sore throat. No ear pain    Cardiovascular:  Chest pain  Respiratory:  Exertional shortness of breath  GI: No abdominal pain.  No nausea or vomiting.  Genitourinary: No abnormal urination.  Neurologic: No headache. No focal weakness.  No numbness.  MSK: no back pain.  Integument: No rashes or lesions.  Hematologic: No easy bruising.  Endocrine: No excessive thirst or urination.    Review of patient's allergies indicates:   Allergen Reactions    Codeine Itching     Can take percocet or vicodin. Pt reports last time she took it she didn't itch.       PMH:  As per HPI and below:  Past Medical History:   Diagnosis Date    GERD (gastroesophageal reflux disease)     Hypertension      Past Surgical History:   Procedure Laterality Date    BREAST SURGERY      bilateral reduction     SECTION      X 1    CHOLECYSTECTOMY  2015    HYSTERECTOMY      JESÚS       Social History     Tobacco Use    Smoking status: Passive Smoke Exposure - Never Smoker    Smokeless tobacco: Never Used   Substance " "Use Topics    Alcohol use: Yes     Alcohol/week: 2.0 standard drinks     Types: 2 Cans of beer per week    Drug use: No       Physical Exam:    /74   Pulse 63   Temp 98.3 °F (36.8 °C) (Oral)   Resp 18   Ht 5' 1" (1.549 m)   Wt 72.6 kg (160 lb)   SpO2 98%   BMI 30.23 kg/m²   Nursing note and vital signs reviewed.  Constitutional: No acute distress.  Tearful, anxious  Eyes: No conjunctival injection.  Extraocular muscles are intact.  ENT: Oropharynx clear.  Cardiovascular:  Regular rate and rhythm no murmurs gallops or rubs.  Chest/ Respiratory:  Clear to auscultation bilaterally without wheezing rhonchi or rales  Abdomen: Soft.  Not distended.  Nontender.  No guarding.  No rebound. Non-peritoneal.  Musculoskeletal: Good range of motion all joints.  No deformities.  Neck supple.  No meningismus.  No edema.  Skin: No rashes seen.  Good turgor.  No abrasions.  No ecchymoses.  Neuro: alert and oriented x3,  no focal neurological deficits.  Psych: Appropriate, conversant    Labs that have been ordered have been independently reviewed and interpreted by myself.    Labs Reviewed   CBC W/ AUTO DIFFERENTIAL - Abnormal; Notable for the following components:       Result Value    MCV 99 (*)     MCH 33.9 (*)     Lymph # 0.9 (*)     Mono # 0.1 (*)     Mono % 2.9 (*)     All other components within normal limits    Narrative:     Release to patient->Immediate   COMPREHENSIVE METABOLIC PANEL - Abnormal; Notable for the following components:    Total Bilirubin 3.5 (*)     Alkaline Phosphatase 163 (*)      (*)      (*)     All other components within normal limits    Narrative:     Release to patient->Immediate   ACETAMINOPHEN LEVEL - Abnormal; Notable for the following components:    Acetaminophen (Tylenol), Serum <3.0 (*)     All other components within normal limits    Narrative:     Release to patient->Immediate  add on Hepatitis panel, acute per DAVY Charles   B-TYPE NATRIURETIC PEPTIDE    " Narrative:     Release to patient->Immediate   TROPONIN I    Narrative:     Release to patient->Immediate   PROTIME-INR    Narrative:     Release to patient->Immediate   HIV 1 / 2 ANTIBODY    Narrative:     Release to patient->Immediate   HEPATITIS C ANTIBODY    Narrative:     Release to patient->Immediate   LIPASE    Narrative:     Release to patient->Immediate   HEPATITIS PANEL, ACUTE    Narrative:     Release to patient->Immediate  add on Hepatitis panel, acute per Daxa Jaquez,FNP   ACETAMINOPHEN LEVEL   DRUG SCREEN PANEL, URINE EMERGENCY       US Abdomen Limited   Final Result      Hepatic diffuse increased parenchymal echogenicity which can be seen with fatty infiltration or hepatitis, among others.  No significant biliary ductal dilatation.      Cholecystectomy.         Electronically signed by: Mp Jeffers MD   Date:    04/01/2022   Time:    12:29      X-Ray Chest PA And Lateral   Final Result      No radiographic acute intrathoracic process seen.      Lateral right lung base small indeterminate nodule.  Further evaluation with elective/nonemergent CT thorax can be obtained, if 2 year stability cannot be confirmed.         Electronically signed by: Mp Jeffers MD   Date:    04/01/2022   Time:    11:12      MRI MRCP Without Contrast    (Results Pending)   MRI MRCP Without Contrast    (Results Pending)       Differential Diagnosis:  Differential Diagnosis includes, but is not limited to:  ACS/MI, PE, aortic dissection, pneumothorax, cardiac tamponade, pericarditis/myocarditis, pneumonia, infection/abscess, lung mass, costochondritis/pleurisy, GERD, biliary disease, pancreatitis      MDM  56 y.o. female with complaining of 2 day history of midsternal chest pain with associated shortness of breath.  Patient has a history of heart catheterization 1 year ago.  EKG today normal sinus rhythm without any evidence of ischemia.  Initial troponin was negative.  Labs revealed an elevated bilirubin and LFTs, with a  history of cholecystectomy.  Ultrasound was obtained to rule out a stone in the common bile duct and was ultimately negative.  MRCP is ordered to evaluate for obstructive pathology.    At time of sign out to Lashon Forbes, MRCP still pending.        ED Course as of 04/02/22 1023   Fri Apr 01, 2022   1121 Hx of : Clinically equivocal but electrocardiographically positive treadmill stress electrocardiogram for ischemia as above.    Underwent heart cath April of last year.  A/P:  · Nonobstructive epicardial coronary arteries  · Normal systemic pressure and elevated LVEDP without transaortic gradient  [AS]   1128 EKG: NSR, HR 76.  No ischemia.  No stemi [AS]   1130 Troponin I: <0.006 [AS]   1132 BILIRUBIN TOTAL(!): 3.5 [AS]   1133 Alkaline Phosphatase(!): 163 [AS]   1133 AST(!): 294 [AS]   1133 ALT(!): 261 [AS]   1153 Lipase: 20 [AS]   1600 Did review labs.  Given obstructive appearance will continue to follow patient with MRCP and GI recommendations.  As there is difficulty obtaining ERCP in this hospital we will keep an the emergency room although moved to the observation area to have an more comfortable room.  She will have p.r.n. pain medication ordered. [LC]   2344 MRCP with no acute obstruction or dilation. Plan to admit to obs for pain control and GI evaluation [LC]      ED Course User Index  [AS] DAVY Estrada  [LC] PHAM Mclean                Attending Attestation:     Physician Attestation Statement for NP/PA:   I discussed this assessment and plan of this patient with the NP/PA, but I did not personally examine the patient. The face to face encounter was performed by the NP/PA.            Diagnostic Impression:    1. Atypical chest pain    2. Chest pain    3. Elevated bilirubin    4. Elevated LFTs                  DAVY Estrada  04/01/22 2128       Anitha Nino MD  04/02/22 1023

## 2022-04-01 NOTE — Clinical Note
Diagnosis: Elevated LFTs [517201]   Future Attending Provider: GILBERT HERNÁNDEZ [[id number]]   Is the patient being sent to ED Observation?: Yes   Admitting Provider:: GILBERT HERNÁNDEZ [[id number]]

## 2022-04-01 NOTE — CONSULTS
Consult Note  Gastroenterology    Consult Requested By: RACH CHEW  Reason for Consult: abnormal lft's    SUBJECTIVE:     History of Present Illness:  Patient is a 56 y.o. female who presents with cp, abd pain. Onset of symptoms was abrupt starting a few days ago with unchanged course since that time. Patient denies dysphagia, odynophagia, reflux symptoms and vomiting. Symptoms are aggravated by none. Symptoms improve with none. Past history includes CCX. Previous studies include ultrasound.     Review of patient's allergies indicates:   Allergen Reactions    Codeine Itching     Can take percocet or vicodin. Pt reports last time she took it she didn't itch.       Scheduled Meds:      Continuous Infusions:      PRN Meds:.  lorazepam, morphine    Past Medical History:   Diagnosis Date    GERD (gastroesophageal reflux disease)     Hypertension        Past Surgical History:   Procedure Laterality Date    BREAST SURGERY      bilateral reduction     SECTION      X 1    CHOLECYSTECTOMY  2015    HYSTERECTOMY      JESÚS       Family History   Problem Relation Age of Onset    Heart attack Mother     Diabetes Mother     Colon cancer Mother     Stroke Father     Hypertension Father     Colon cancer Father     Hypertension Sister     Ovarian cancer Cousin     Breast cancer Neg Hx        Social History     Socioeconomic History    Marital status: Single   Tobacco Use    Smoking status: Passive Smoke Exposure - Never Smoker    Smokeless tobacco: Never Used   Substance and Sexual Activity    Alcohol use: Yes     Alcohol/week: 2.0 standard drinks     Types: 2 Cans of beer per week    Drug use: No    Sexual activity: Not Currently   Social History Narrative    Nursing Assistance off . Currently off.        Review of Systems:  Constitutional: no fever or chills  Eyes: no visual changes  ENT: no nasal congestion or sore throat  Respiratory: no cough or shorness of breath  Cardiovascular: no chest pain or  palpitations  Gastrointestinal: no nausea or vomiting, no abdominal pain or change in bowel habits  Genitourinary: no hematuria or dysuria    OBJECTIVE:   Physical Exam:  General: Well developed, well nourished, no apparent distress  Vital Signs Range (Last 24H):  Temp:  [98.3 °F (36.8 °C)]   Pulse:  [68-85]   Resp:  [13-19]   BP: (121-169)/(67-90)   SpO2:  [97 %-100 %]   HEENT: Anicteric, PERRLA  CVS: S1, S2, no murmers/rubs  Lungs: CTA-B, normal excursion  Abdomen: Soft, NT, ND, normal BS's  Extremities: No c/c/e, 1+ DP pulses to BLE's  Skin: No rashes/lesions.      Laboratory:    CBC:   Recent Labs   Lab 04/01/22  1054   WBC 4.17   RBC 4.19   HGB 14.2   HCT 41.4      MCV 99*   MCH 33.9*   MCHC 34.3     BMP:   Recent Labs   Lab 04/01/22  1054   GLU 98      K 3.6      CO2 23   BUN 12   CREATININE 0.9   CALCIUM 9.7     Coagulation:   Recent Labs   Lab 04/01/22  1054   LABPROT 10.6   INR 1.0       Recent Results (from the past 336 hour(s))   CBC auto differential    Collection Time: 04/01/22 10:54 AM   Result Value Ref Range    WBC 4.17 3.90 - 12.70 K/uL    Hemoglobin 14.2 12.0 - 16.0 g/dL    Hematocrit 41.4 37.0 - 48.5 %    Platelets 202 150 - 450 K/uL      Recent Labs   Lab 04/01/22  1054   INR 1.0     Recent Labs   Lab 04/01/22  1054      K 3.6      CO2 23   BUN 12   CREATININE 0.9   CALCIUM 9.7   PROT 7.2   BILITOT 3.5*   ALKPHOS 163*   *   *      Lab Results   Component Value Date    HEPCAB Negative 04/01/2022    No results found for: AFP   No results found for: CEA       Diagnostic Results:  US: I have personally reviewed the report    ASSESSMENT/PLAN:     1. Atypical chest pain    2. Chest pain    3. Elevated bilirubin    4. Elevated LFTs        Plan: 1) check hep panel  2) tylenol level and drug screen  3) agree with MRCP  4) repeat labs in AM

## 2022-04-01 NOTE — ED NOTES
Pt's chest pain decreased from 8 to 7 after 1 SL Nitroglycerin. NP aware, no further action requested.

## 2022-04-01 NOTE — ED NOTES
Report received from DANIEL Perry. Pt resting comfortably in bed, AAO x4. Resp even & unlabored, denies dyspnea. Reports 8/10 pain and tightness in chest. Comfort/restroom needs addressed. Call light and personal items within reach. bed in lowest position, wheels locked, siderails up x2. Pt on continuous cardiac monitoring & pulse ox with BP cycling every 30 min.

## 2022-04-01 NOTE — ED TRIAGE NOTES
"Pt to room at this time. Pt states she came to ED for unresolved chest pain x2 days. Pt states, "I have been having chest pain for two days, it feels tight and sharp and it's on the right side." pt states she has been taking Excedrin and Advil. Pt endorses pain at a 10 at this time. Pt placed on monitor and is VSS.   "

## 2022-04-02 VITALS
TEMPERATURE: 99 F | BODY MASS INDEX: 30.21 KG/M2 | DIASTOLIC BLOOD PRESSURE: 55 MMHG | WEIGHT: 160 LBS | OXYGEN SATURATION: 95 % | SYSTOLIC BLOOD PRESSURE: 96 MMHG | RESPIRATION RATE: 17 BRPM | HEART RATE: 58 BPM | HEIGHT: 61 IN

## 2022-04-02 LAB
ALBUMIN SERPL BCP-MCNC: 3.3 G/DL (ref 3.5–5.2)
ALP SERPL-CCNC: 186 U/L (ref 55–135)
ALT SERPL W/O P-5'-P-CCNC: 250 U/L (ref 10–44)
ANION GAP SERPL CALC-SCNC: 13 MMOL/L (ref 8–16)
AST SERPL-CCNC: 192 U/L (ref 10–40)
BASOPHILS # BLD AUTO: 0.04 K/UL (ref 0–0.2)
BASOPHILS NFR BLD: 1 % (ref 0–1.9)
BILIRUB SERPL-MCNC: 3.6 MG/DL (ref 0.1–1)
BUN SERPL-MCNC: 11 MG/DL (ref 6–20)
CALCIUM SERPL-MCNC: 9.3 MG/DL (ref 8.7–10.5)
CHLORIDE SERPL-SCNC: 102 MMOL/L (ref 95–110)
CO2 SERPL-SCNC: 23 MMOL/L (ref 23–29)
CREAT SERPL-MCNC: 0.8 MG/DL (ref 0.5–1.4)
CTP QC/QA: YES
DIFFERENTIAL METHOD: ABNORMAL
EOSINOPHIL # BLD AUTO: 0.2 K/UL (ref 0–0.5)
EOSINOPHIL NFR BLD: 4.6 % (ref 0–8)
ERYTHROCYTE [DISTWIDTH] IN BLOOD BY AUTOMATED COUNT: 12 % (ref 11.5–14.5)
EST. GFR  (AFRICAN AMERICAN): >60 ML/MIN/1.73 M^2
EST. GFR  (NON AFRICAN AMERICAN): >60 ML/MIN/1.73 M^2
FERRITIN SERPL-MCNC: 281 NG/ML (ref 20–300)
GLUCOSE SERPL-MCNC: 87 MG/DL (ref 70–110)
HCT VFR BLD AUTO: 37.7 % (ref 37–48.5)
HGB BLD-MCNC: 12.8 G/DL (ref 12–16)
IMM GRANULOCYTES # BLD AUTO: 0.01 K/UL (ref 0–0.04)
IMM GRANULOCYTES NFR BLD AUTO: 0.2 % (ref 0–0.5)
LYMPHOCYTES # BLD AUTO: 1.1 K/UL (ref 1–4.8)
LYMPHOCYTES NFR BLD: 26.9 % (ref 18–48)
MCH RBC QN AUTO: 33.2 PG (ref 27–31)
MCHC RBC AUTO-ENTMCNC: 34 G/DL (ref 32–36)
MCV RBC AUTO: 98 FL (ref 82–98)
MONOCYTES # BLD AUTO: 0.3 K/UL (ref 0.3–1)
MONOCYTES NFR BLD: 7.9 % (ref 4–15)
NEUTROPHILS # BLD AUTO: 2.5 K/UL (ref 1.8–7.7)
NEUTROPHILS NFR BLD: 59.4 % (ref 38–73)
NRBC BLD-RTO: 0 /100 WBC
PLATELET # BLD AUTO: 197 K/UL (ref 150–450)
PMV BLD AUTO: 11.4 FL (ref 9.2–12.9)
POTASSIUM SERPL-SCNC: 3.9 MMOL/L (ref 3.5–5.1)
PROT SERPL-MCNC: 6.7 G/DL (ref 6–8.4)
RBC # BLD AUTO: 3.86 M/UL (ref 4–5.4)
SARS-COV-2 RDRP RESP QL NAA+PROBE: NEGATIVE
SODIUM SERPL-SCNC: 138 MMOL/L (ref 136–145)
TSH SERPL DL<=0.005 MIU/L-ACNC: 1.54 UIU/ML (ref 0.4–4)
WBC # BLD AUTO: 4.16 K/UL (ref 3.9–12.7)

## 2022-04-02 PROCEDURE — 86038 ANTINUCLEAR ANTIBODIES: CPT | Performed by: INTERNAL MEDICINE

## 2022-04-02 PROCEDURE — 25000003 PHARM REV CODE 250: Performed by: PHYSICIAN ASSISTANT

## 2022-04-02 PROCEDURE — 96375 TX/PRO/DX INJ NEW DRUG ADDON: CPT

## 2022-04-02 PROCEDURE — 82390 ASSAY OF CERULOPLASMIN: CPT | Performed by: INTERNAL MEDICINE

## 2022-04-02 PROCEDURE — 96376 TX/PRO/DX INJ SAME DRUG ADON: CPT

## 2022-04-02 PROCEDURE — 85025 COMPLETE CBC W/AUTO DIFF WBC: CPT | Performed by: PHYSICIAN ASSISTANT

## 2022-04-02 PROCEDURE — 36415 COLL VENOUS BLD VENIPUNCTURE: CPT | Performed by: INTERNAL MEDICINE

## 2022-04-02 PROCEDURE — 86256 FLUORESCENT ANTIBODY TITER: CPT | Mod: 91 | Performed by: INTERNAL MEDICINE

## 2022-04-02 PROCEDURE — 96361 HYDRATE IV INFUSION ADD-ON: CPT

## 2022-04-02 PROCEDURE — 82728 ASSAY OF FERRITIN: CPT | Performed by: INTERNAL MEDICINE

## 2022-04-02 PROCEDURE — 63600175 PHARM REV CODE 636 W HCPCS: Performed by: PHYSICIAN ASSISTANT

## 2022-04-02 PROCEDURE — 80053 COMPREHEN METABOLIC PANEL: CPT | Performed by: PHYSICIAN ASSISTANT

## 2022-04-02 PROCEDURE — 84443 ASSAY THYROID STIM HORMONE: CPT | Performed by: INTERNAL MEDICINE

## 2022-04-02 PROCEDURE — G0378 HOSPITAL OBSERVATION PER HR: HCPCS

## 2022-04-02 PROCEDURE — 36415 COLL VENOUS BLD VENIPUNCTURE: CPT | Performed by: PHYSICIAN ASSISTANT

## 2022-04-02 PROCEDURE — 86235 NUCLEAR ANTIGEN ANTIBODY: CPT | Performed by: INTERNAL MEDICINE

## 2022-04-02 RX ORDER — ONDANSETRON 4 MG/1
4 TABLET, ORALLY DISINTEGRATING ORAL EVERY 8 HOURS PRN
Qty: 9 TABLET | Refills: 0 | Status: SHIPPED | OUTPATIENT
Start: 2022-04-02 | End: 2022-04-05

## 2022-04-02 RX ORDER — KETOROLAC TROMETHAMINE 30 MG/ML
10 INJECTION, SOLUTION INTRAMUSCULAR; INTRAVENOUS ONCE
Status: COMPLETED | OUTPATIENT
Start: 2022-04-02 | End: 2022-04-02

## 2022-04-02 RX ORDER — NAPROXEN 500 MG/1
500 TABLET ORAL EVERY 12 HOURS PRN
Qty: 10 TABLET | Refills: 0 | Status: SHIPPED | OUTPATIENT
Start: 2022-04-02

## 2022-04-02 RX ADMIN — ASPIRIN 81 MG: 81 TABLET, DELAYED RELEASE ORAL at 08:04

## 2022-04-02 RX ADMIN — VENLAFAXINE 75 MG: 37.5 TABLET ORAL at 08:04

## 2022-04-02 RX ADMIN — KETOROLAC TROMETHAMINE 10 MG: 30 INJECTION, SOLUTION INTRAMUSCULAR at 11:04

## 2022-04-02 RX ADMIN — HYDROCHLOROTHIAZIDE 25 MG: 25 TABLET ORAL at 12:04

## 2022-04-02 RX ADMIN — TRAZODONE HYDROCHLORIDE 50 MG: 50 TABLET ORAL at 12:04

## 2022-04-02 RX ADMIN — METOPROLOL SUCCINATE 25 MG: 25 TABLET, EXTENDED RELEASE ORAL at 08:04

## 2022-04-02 RX ADMIN — FAMOTIDINE 20 MG: 10 INJECTION, SOLUTION INTRAVENOUS at 08:04

## 2022-04-02 RX ADMIN — SODIUM CHLORIDE, SODIUM LACTATE, POTASSIUM CHLORIDE, AND CALCIUM CHLORIDE: .6; .31; .03; .02 INJECTION, SOLUTION INTRAVENOUS at 12:04

## 2022-04-02 RX ADMIN — POLYETHYLENE GLYCOL 3350 17 G: 17 POWDER, FOR SOLUTION ORAL at 08:04

## 2022-04-02 RX ADMIN — LISINOPRIL 20 MG: 20 TABLET ORAL at 12:04

## 2022-04-02 RX ADMIN — FAMOTIDINE 20 MG: 10 INJECTION, SOLUTION INTRAVENOUS at 12:04

## 2022-04-02 RX ADMIN — VENLAFAXINE 75 MG: 37.5 TABLET ORAL at 12:04

## 2022-04-02 RX ADMIN — MORPHINE SULFATE 2 MG: 4 INJECTION, SOLUTION INTRAMUSCULAR; INTRAVENOUS at 08:04

## 2022-04-02 NOTE — PROGRESS NOTES
ED Observation Unit  Progress Note      HPI   Desean Poole is a 56 y.o. female presenting with complaint of right-sided/midsternal chest pain that began 2 days ago, she reports that is intermittent in nature but has been more persistent over the last few hours.  Described as a heaviness, pain is 10/10.  Last night pain was also in her epigastric region.  She denies any injury/trauma.  Patient reports had an abnormal stress test early last year and underwent a angiogram in April of last year.  Currently on metoprolol and lisinopril.  She also reports some exertional shortness of breath associated with the chest pain.    Interval History   Labs are stable.  Vital signs are stable.  Patient reports overall improvement in symptoms.  Abdominal pain is only intermittent and is controlled with medication.  Nausea and vomiting has resolved.  Tolerating orals.    Per GI consult:  Recommendations:  1. Follow LFT's.  2. Follow up with Viral Hep (A and B).  3. Avoid hepatotoxic meds.  4. OK for diet.  5. OK for D/C to home, will need close follow up to follow LFT's, will go ahead and order autoimmune Hep serologies that can be followed up in clinic.    6. If does not improve, may need liver biopsy, as outpatient.      PMHx   Past Medical History:   Diagnosis Date    GERD (gastroesophageal reflux disease)     Hypertension       Past Surgical History:   Procedure Laterality Date    BREAST SURGERY      bilateral reduction     SECTION      X 1    CHOLECYSTECTOMY  2015    HYSTERECTOMY      JESÚS        Family Hx   Family History   Problem Relation Age of Onset    Heart attack Mother     Diabetes Mother     Colon cancer Mother     Stroke Father     Hypertension Father     Colon cancer Father     Hypertension Sister     Ovarian cancer Cousin     Breast cancer Neg Hx         Social Hx   Social History     Socioeconomic History    Marital status: Single   Tobacco Use    Smoking status: Passive Smoke Exposure -  Never Smoker    Smokeless tobacco: Never Used   Substance and Sexual Activity    Alcohol use: Yes     Alcohol/week: 2.0 standard drinks     Types: 2 Cans of beer per week    Drug use: No    Sexual activity: Not Currently   Social History Narrative    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         Vital Signs   Vitals:    04/02/22 0858 04/02/22 1000 04/02/22 1200 04/02/22 1213   BP:    (!) 96/55   BP Location:    Right arm   Patient Position:    Lying   Pulse:  (!) 56 (!) 52 (!) 58   Resp: 18   17   Temp:    99 °F (37.2 °C)   TempSrc:    Oral   SpO2:    95%   Weight:       Height:            Review of Systems  Review of Systems   Constitutional: Negative for fever.   HENT: Negative for congestion.    Respiratory: Negative for cough and shortness of breath.    Cardiovascular: Negative for chest pain.   Gastrointestinal: Positive for abdominal pain (Intermittent, improvement overall). Negative for diarrhea, nausea and vomiting.   Genitourinary: Negative for dysuria.   Musculoskeletal: Negative for joint pain and myalgias.   Skin: Negative for rash.   Neurological: Negative for weakness and headaches.   Psychiatric/Behavioral: Negative.        Brief Physical Exam/Reassessment   Physical Exam  Constitutional:       General: She is not in acute distress.  HENT:      Head: Normocephalic and atraumatic.   Cardiovascular:      Rate and Rhythm: Normal rate and regular rhythm.      Heart sounds: Normal heart sounds.   Pulmonary:      Effort: Pulmonary effort is normal.      Breath sounds: Normal breath sounds.   Musculoskeletal:         General: Normal range of motion.   Skin:     General: Skin is warm and dry.   Neurological:      General: No focal deficit present.      Mental Status: She is alert.   Psychiatric:         Mood and Affect: Mood normal.         Behavior: Behavior normal.         Labs/Imaging   Labs Reviewed   CBC W/ AUTO DIFFERENTIAL - Abnormal; Notable for the following components:       Result Value     MCV 99 (*)     MCH 33.9 (*)     Lymph # 0.9 (*)     Mono # 0.1 (*)     Mono % 2.9 (*)     All other components within normal limits    Narrative:     Release to patient->Immediate   COMPREHENSIVE METABOLIC PANEL - Abnormal; Notable for the following components:    Total Bilirubin 3.5 (*)     Alkaline Phosphatase 163 (*)      (*)      (*)     All other components within normal limits    Narrative:     Release to patient->Immediate   ACETAMINOPHEN LEVEL - Abnormal; Notable for the following components:    Acetaminophen (Tylenol), Serum <3.0 (*)     All other components within normal limits    Narrative:     Release to patient->Immediate  add on Hepatitis panel, acute per DAVY Charles   CBC W/ AUTO DIFFERENTIAL - Abnormal; Notable for the following components:    RBC 3.86 (*)     MCH 33.2 (*)     All other components within normal limits   COMPREHENSIVE METABOLIC PANEL - Abnormal; Notable for the following components:    Albumin 3.3 (*)     Total Bilirubin 3.6 (*)     Alkaline Phosphatase 186 (*)      (*)      (*)     All other components within normal limits   B-TYPE NATRIURETIC PEPTIDE    Narrative:     Release to patient->Immediate   TROPONIN I    Narrative:     Release to patient->Immediate   PROTIME-INR    Narrative:     Release to patient->Immediate   HIV 1 / 2 ANTIBODY    Narrative:     Release to patient->Immediate   HEPATITIS C ANTIBODY    Narrative:     Release to patient->Immediate   LIPASE    Narrative:     Release to patient->Immediate   HEPATITIS PANEL, ACUTE    Narrative:     Release to patient->Immediate  add on Hepatitis panel, acute per AMADA CharlesP   ACETAMINOPHEN LEVEL   TSH   KONRAD PROFILE I (SCREEN) W/ REFLEX   ANTI-SMOOTH MUSCLE ANTIBODY   FERRITIN   CERULOPLASMIN   ANTIMITOCHONDRIAL ANTIBODY   SARS-COV-2 RDRP GENE      Imaging Results          MRI MRCP Without Contrast (Final result)  Result time 04/01/22 23:24:02    Final result by Binh Boss MD  (04/01/22 23:24:02)                 Impression:      No findings of choledocholithiasis or biliary duct dilatation.    Hepatic steatosis.    Prior cholecystectomy.    Benign-appearing T2 hyperintense lesion in the superior right hepatic dome.  Simple left renal cyst.      Electronically signed by: Binh Boss MD  Date:    04/01/2022  Time:    23:24             Narrative:    EXAMINATION:  MRI ABDOMEN WITHOUT CONTRAST MRCP    CLINICAL HISTORY:  biliary obstruction;    TECHNIQUE:  Multiplanar, multisequence images are performed through the liver and upper abdomen. Additionally 2D and 3D MRCP sequences are performed as well as MIP images.    COMPARISON:  Abdominal ultrasound, 04/01/2022.    FINDINGS:  Lung bases are clear. Heart size is normal.    There is diffuse hepatic steatosis.    Liver is normal in size and contour. Small T2 hyperintense lesion in the right hepatic dome, most likely a small hemangioma or cyst.  No intrahepatic biliary ductal dilatation.    Gallbladder is surgically absent.  The common bile duct is normal in caliber, measuring approximately 5 mm in diameter.  No common bile duct filling defects identified.    The pancreas demonstrates normal intrinsic signal.  No pancreatic lesion or pancreatic ductal dilatation.    Spleen is normal in size and signal.    Adrenal glands are normal in appearance.    Kidneys are symmetric.  No hydronephrosis. Simple appearing cyst in the lower pole of the left kidney.    The visualized bowel is unremarkable.    Abdominal aorta is normal in caliber.    Bone marrow signal is unremarkable.                               US Abdomen Limited (Final result)  Result time 04/01/22 12:29:32    Final result by Mp Jeffers MD (04/01/22 12:29:32)                 Impression:      Hepatic diffuse increased parenchymal echogenicity which can be seen with fatty infiltration or hepatitis, among others.  No significant biliary ductal  dilatation.    Cholecystectomy.      Electronically signed by: Mp Jeffers MD  Date:    04/01/2022  Time:    12:29             Narrative:    EXAMINATION:  US ABDOMEN LIMITED    CLINICAL HISTORY:  elevated bili;    TECHNIQUE:  Limited ultrasound of the right upper quadrant of the abdomen (including pancreas, liver, gallbladder, common bile duct, and spleen) was performed.    COMPARISON:  CT renal stone study 11/09/2019 and right upper quadrant ultrasound 11/10/2015    FINDINGS:  Liver: Normal in size, measuring 16.2 cm. Diffuse increased parenchymal echogenicity.  No focal hepatic lesions.    Gallbladder: Surgically absent.    Biliary system: The common duct is not dilated, measuring 4 mm.  No intrahepatic ductal dilatation.    Spleen: Normal in size and echotexture, measuring 7.1 cm.    Miscellaneous: No upper abdominal ascites.  Imaged portions of the pancreas and IVC are within normal limits.  No right hydronephrosis.                               X-Ray Chest PA And Lateral (Final result)  Result time 04/01/22 11:12:32    Final result by Mp Jeffers MD (04/01/22 11:12:32)                 Impression:      No radiographic acute intrathoracic process seen.    Lateral right lung base small indeterminate nodule.  Further evaluation with elective/nonemergent CT thorax can be obtained, if 2 year stability cannot be confirmed.      Electronically signed by: Mp Jeffers MD  Date:    04/01/2022  Time:    11:12             Narrative:    EXAMINATION:  XR CHEST PA AND LATERAL    CLINICAL HISTORY:  Chest Pain;    TECHNIQUE:  PA and lateral views of the chest were performed.    COMPARISON:  Chest radiograph 01/29/2021    FINDINGS:  Monitoring leads overlie the chest.Small nodular density projects over the lateral right lung base on the frontal view which could represent a nipple shadow but remains indeterminate.  Bibasilar few scattered linear opacities consistent with minimal platelike scarring versus atelectasis.  The  lungs are otherwise well expanded without large consolidation, pleural effusion or pneumothorax.    The cardiac silhouette is normal in size. The hilar and mediastinal contours are within normal limits for age noting calcification at the level of the arch.    Osseous structures appear stable without acute process seen.                                 I reviewed all labs, imaging, EKGs.     Plan   1. Elevated LFTs\  2. Elevated bilirubin  3. Abdominal pain  4. Atypical chest pain    Cleared for discharge home per GI.  Improvement symptoms, tolerating diet.

## 2022-04-02 NOTE — H&P
ED Observation Unit  History and Physical      I assumed care of this patient from the Main ED at onset of observation time, now on 2022.       History of Present Illness:    Desean Poole is a 56 y.o. female presenting with complaint of right-sided/midsternal chest pain that began 2 days ago, she reports that is intermittent in nature but has been more persistent over the last few hours.  Described as a heaviness, pain is 10/10.  Last night pain was also in her epigastric region.  She denies any injury/trauma.  Patient reports had an abnormal stress test early last year and underwent a angiogram in April of last year.  Currently on metoprolol and lisinopril.  She also reports some exertional shortness of breath associated with the chest pain.     This is the extent to the patients complaints today here in the emergency department.    I reviewed the ED Provider Note dated 2022 prior to my evaluation of this patient.  I reviewed all labs and imaging performed in the Main ED, prior to patient being placed in Observation. Patient was placed in the ED Observation Unit for right abdominal pain    PMHx   Past Medical History:   Diagnosis Date    GERD (gastroesophageal reflux disease)     Hypertension       Past Surgical History:   Procedure Laterality Date    BREAST SURGERY      bilateral reduction     SECTION      X 1    CHOLECYSTECTOMY  2015    HYSTERECTOMY      JESÚS        Family Hx   Family History   Problem Relation Age of Onset    Heart attack Mother     Diabetes Mother     Colon cancer Mother     Stroke Father     Hypertension Father     Colon cancer Father     Hypertension Sister     Ovarian cancer Cousin     Breast cancer Neg Hx         Social Hx   Social History     Socioeconomic History    Marital status: Single   Tobacco Use    Smoking status: Passive Smoke Exposure - Never Smoker    Smokeless tobacco: Never Used   Substance and Sexual Activity    Alcohol use: Yes      Alcohol/week: 2.0 standard drinks     Types: 2 Cans of beer per week    Drug use: No    Sexual activity: Not Currently   Social History Narrative    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         Vital Signs   Vitals:    04/01/22 1516 04/01/22 1641 04/01/22 2014 04/01/22 2205   BP:  (!) 150/67 125/74    BP Location:  Right arm     Patient Position:  Lying     Pulse:  64 63    Resp: 17 16 18 19   Temp:  98.4 °F (36.9 °C) 98.3 °F (36.8 °C)    TempSrc:  Oral Oral    SpO2:  100% 98%    Weight:       Height:            Review of Systems  Constitutional: No fever.  No chills.  Eyes: No visual changes.  ENT: No sore throat. No ear pain    Cardiovascular:  Chest pain  Respiratory:  Exertional shortness of breath  GI: + abdominal pain.  + nausea or vomiting.  Genitourinary: No abnormal urination.  Neurologic: No headache. No focal weakness.  No numbness.  MSK: no back pain.  Integument: No rashes or lesions.  Hematologic: No easy bruising.  Endocrine: No excessive thirst or urination.      Physical Exam  Constitutional: No acute distress.  Tearful, anxious  Eyes: No conjunctival injection.  Extraocular muscles are intact.  ENT: Oropharynx clear.  Cardiovascular:  Regular rate and rhythm no murmurs gallops or rubs.  Chest/ Respiratory:  Clear to auscultation bilaterally without wheezing rhonchi or rales  Abdomen: Soft.  Not distended.  tenderness with palpation of epigastric and RUQ; no sanchez sign.  No guarding.  No rebound. Non-peritoneal.  Musculoskeletal: Good range of motion all joints.  No deformities.  Neck supple.  No meningismus.  No edema.  Skin: No rashes seen.  Good turgor.  No abrasions.  No ecchymoses.  Neuro: alert and oriented x3,  no focal neurological deficits.  Psych: Appropriate, conversant      Medications:   Scheduled Meds:  Continuous Infusions:  PRN Meds:.morphine, ondansetron      Assessment/Plan:  1. Atypical chest pain    2. Chest pain    3. Elevated bilirubin    4. Elevated LFTs      1.  Unremarkable CP workup; likely related to abdominal pain    2. Elevated Tbili and LFTS- MRCP with no obstruction dilation. Will follow tylenol levels, hepatitis panel and await further GI recs. US reveals Hepatic diffuse increased parenchymal echogenicity which can be seen with fatty infiltration or hepatitis or additional etiologies    3. Abdominal pain- PRN med's with antiemetic as some nausea with pain. Clear fluids    Case was discussed with the ED provider, and GI specialist

## 2022-04-02 NOTE — PROGRESS NOTES
"Chief Complaint / Reason for Consult: Abnormal LFT's    No acute events overnight, patient reports her neck pain has improved    ROS: No CP, SOB, F/C    Patient Vitals for the past 24 hrs:   BP Temp Temp src Pulse Resp SpO2 Height Weight   04/02/22 0858 -- -- -- -- 18 -- -- --   04/02/22 0818 (!) 112/56 98.5 °F (36.9 °C) Oral (!) 58 18 96 % -- --   04/02/22 0800 -- -- -- (!) 52 -- -- -- --   04/02/22 0700 -- -- -- 64 -- -- -- --   04/02/22 0600 -- -- -- (!) 58 -- -- -- --   04/02/22 0417 (!) 100/49 98.3 °F (36.8 °C) -- (!) 57 15 96 % -- --   04/02/22 0400 -- -- -- (!) 58 -- -- -- --   04/02/22 0200 -- -- -- 62 -- -- -- --   04/02/22 0015 135/66 97.9 °F (36.6 °C) -- 62 17 97 % -- --   04/01/22 2205 -- -- -- -- 19 -- -- --   04/01/22 2014 125/74 98.3 °F (36.8 °C) Oral 63 18 98 % -- --   04/01/22 1641 (!) 150/67 98.4 °F (36.9 °C) Oral 64 16 100 % -- --   04/01/22 1516 -- -- -- -- 17 -- -- --   04/01/22 1407 -- -- -- 68 -- -- -- --   04/01/22 1331 (!) 154/71 -- -- 70 19 99 % -- --   04/01/22 1311 -- -- -- -- 16 -- -- --   04/01/22 1303 139/80 -- -- 70 -- 99 % -- --   04/01/22 1231 135/75 -- -- 70 -- 100 % -- --   04/01/22 1202 124/71 -- -- 68 15 100 % -- --   04/01/22 1132 (!) 169/90 -- -- 81 -- 100 % -- --   04/01/22 1128 -- -- -- 72 13 100 % -- --   04/01/22 1127 132/79 -- -- -- -- -- -- --   04/01/22 1048 121/67 -- -- 78 18 100 % -- --   04/01/22 1039 121/67 -- -- 78 14 100 % -- --   04/01/22 1011 (!) 146/78 98.3 °F (36.8 °C) Oral 85 16 97 % 5' 1" (1.549 m) 72.6 kg (160 lb)       Physical Exam:  Gen - Well developed, well nourished, no apparent distress  HEENT - Anicteric  CV - S1, S2, no murmurs/rubs  Lungs - CTA-B, normal excursion  Abd - Soft, NT, ND, normal BS's, no HSM.  Ext - No c/c/e  Neuro - No asterixis    Labs:  Recent Labs   Lab 04/02/22  0525   WBC 4.16   RBC 3.86*   HGB 12.8   HCT 37.7      MCV 98   MCH 33.2*   MCHC 34.0     Recent Labs   Lab 04/02/22  0525   CALCIUM 9.3   PROT 6.7      K " 3.9   CO2 23      BUN 11   CREATININE 0.8   ALKPHOS 186*   *   *   BILITOT 3.6*     Recent Labs   Lab 04/01/22  1054   INR 1.0     Tylenol < 3  Hep C - Negative    Imaging:  MRCP:  No findings of choledocholithiasis or biliary duct dilatation.     Hepatic steatosis.     Prior cholecystectomy.     Benign-appearing T2 hyperintense lesion in the superior right hepatic dome.  Simple left renal cyst.    Assessment:  This patient is a 56 y.o. female with:   1. Abnormal LFT's - Mixed pattern.  MRCP negative for CBD obstruction.  R/o Viral Hep vs. Medication.  Tylenol level not elevated.  AST/ALT improving, Bili stable.  S/p Cholecystectomy in 2015  2. Chest Pain, Neck Pain  3. Hx of HTN....    Recommendations:  1. Follow LFT's.  2. Follow up with Viral Hep (A and B).  3. Avoid hepatotoxic meds.  4. OK for diet.  5. OK for D/C to home, will need close follow up to follow LFT's, will go ahead and order autoimmune Hep serologies that can be followed up in clinic.    6. If does not improve, may need liver biopsy, as outpatient.

## 2022-04-02 NOTE — ED NOTES
In good spirits, ambulating around room, discharge orders noted.  Telemetry box removed and returned to [place] in tele bunker

## 2022-04-02 NOTE — DISCHARGE SUMMARY
ED Observation Unit  Discharge Summary        History of Present Illness:    Desean Poole is a 56 y.o. female presenting with complaint of right-sided/midsternal chest pain that began 2 days ago, she reports that is intermittent in nature but has been more persistent over the last few hours.  Described as a heaviness, pain is 10/10.  Last night pain was also in her epigastric region.  She denies any injury/trauma.  Patient reports had an abnormal stress test early last year and underwent a angiogram in April of last year.  Currently on metoprolol and lisinopril.  She also reports some exertional shortness of breath associated with the chest pain.    Observation Course:    Labs are stable.  Vital signs are stable.  Patient reports overall improvement in symptoms.  Abdominal pain is only intermittent and is controlled with medication.  Nausea and vomiting has resolved.  Tolerating orals.    Consultants:    Per GI consult:  Recommendations:  1. Follow LFT's.  2. Follow up with Viral Hep (A and B).  3. Avoid hepatotoxic meds.  4. OK for diet.  5. OK for D/C to home, will need close follow up to follow LFT's, will go ahead and order autoimmune Hep serologies that can be followed up in clinic.    6. If does not improve, may need liver biopsy, as outpatient.      Final Diagnosis:  1. Elevated LFTs  2. Elevated bilirubin  3. Abdominal pain  4. Atypical chest pain    Discharge Condition: Good    Disposition: Home or Self Care     Time spent on the discharge of the patient including review of hospital course with the patient. reviewing discharge medications and arranging follow-up care 35 minutes.  Patient was seen and examined on the date of discharge and determined to be suitable for discharge.    Follow Up:  GI follow-up in 2 weeks.    Patient/caregiver voices understanding and feels comfortable with discharge plan.      The patient acknowledges that close follow up with medical provider is required. Instructed to  follow up with PCP within 2 days. Patient was given specific return precautions. The patient agrees to comply with all instruction and directions given in the ER.     Plan of care discussed with collaborating provider. Agrees with plan of care today.

## 2022-04-02 NOTE — DISCHARGE INSTRUCTIONS
**Follow up with GI in 2 weeks, will need repeat labs. Return to ER with worsening of symptoms.     **Naproxen as needed for pain. Zofran as needed for nausea. Drink plenty fluids. Get plenty rest. Wash hands frequently.

## 2022-04-02 NOTE — ED NOTES
"-pt reports feeling "great" this morning, tolerated full liquids and diet advanced per order, given menu w/ instructions for ordering  LOC: The patient is awake, alert and aware of environment with an appropriate affect, the patient is oriented x 3 and speaking appropriately.  APPEARANCE: Patient resting comfortably and in no acute distress, patient is clean and well groomed, patient's clothing is properly fastened.  SKIN: The skin is warm and dry, patient has normal skin turgor and moist mucus membranes, skin intact, no breakdown or brusing noted.  MUSKULOSKELETAL: Patient moving all extremities well, no obvious swelling or deformities noted.  RESPIRATORY: Airway is open and patent, respirations are spontaneous, patient has a normal effort and rate. Breath sounds are clear and equal bilaterally.  CARDIAC: Normal heart sounds. No peripheral edema.  ABDOMEN: Soft and non tender to palpation, no distention noted. Bowel sounds present.  "

## 2022-04-03 LAB — CERULOPLASMIN SERPL-MCNC: 40 MG/DL (ref 15–45)

## 2022-04-04 LAB
ANA SER QL IF: NORMAL
HAV IGM SERPL QL IA: NEGATIVE
HBV CORE IGM SERPL QL IA: NEGATIVE
HBV SURFACE AG SERPL QL IA: NEGATIVE
HCV AB SERPL QL IA: NEGATIVE

## 2022-04-06 LAB
MITOCHONDRIA AB TITR SER IF: NORMAL {TITER}
SMOOTH MUSCLE AB TITR SER IF: NORMAL {TITER}

## 2023-07-18 DIAGNOSIS — Z12.31 VISIT FOR SCREENING MAMMOGRAM: Primary | ICD-10-CM

## 2023-08-09 ENCOUNTER — HOSPITAL ENCOUNTER (OUTPATIENT)
Dept: RADIOLOGY | Facility: HOSPITAL | Age: 58
Discharge: HOME | End: 2023-08-09
Attending: INTERNAL MEDICINE
Payer: MEDICARE

## 2023-08-09 DIAGNOSIS — Z12.31 VISIT FOR SCREENING MAMMOGRAM: ICD-10-CM

## 2023-08-09 PROCEDURE — 77067 SCR MAMMO BI INCL CAD: CPT | Mod: TC

## 2023-08-09 PROCEDURE — 77063 MAMMO DIGITAL SCREENING BILAT WITH TOMO: ICD-10-PCS | Mod: 26,,, | Performed by: RADIOLOGY

## 2023-08-09 PROCEDURE — 77067 SCR MAMMO BI INCL CAD: CPT | Mod: 26,,, | Performed by: RADIOLOGY

## 2023-08-09 PROCEDURE — 77067 MAMMO DIGITAL SCREENING BILAT WITH TOMO: ICD-10-PCS | Mod: 26,,, | Performed by: RADIOLOGY

## 2023-08-09 PROCEDURE — 77063 BREAST TOMOSYNTHESIS BI: CPT | Mod: 26,,, | Performed by: RADIOLOGY

## 2024-09-25 DIAGNOSIS — Z12.31 ENCOUNTER FOR SCREENING MAMMOGRAM FOR MALIGNANT NEOPLASM OF BREAST: Primary | ICD-10-CM

## 2024-09-26 ENCOUNTER — HOSPITAL ENCOUNTER (OUTPATIENT)
Dept: RADIOLOGY | Facility: HOSPITAL | Age: 59
Discharge: HOME | End: 2024-09-26
Attending: INTERNAL MEDICINE
Payer: MEDICARE

## 2024-09-26 DIAGNOSIS — R10.9 FLANK PAIN: Primary | ICD-10-CM

## 2024-09-26 DIAGNOSIS — Z12.31 ENCOUNTER FOR SCREENING MAMMOGRAM FOR MALIGNANT NEOPLASM OF BREAST: ICD-10-CM

## 2024-09-26 PROCEDURE — 77067 SCR MAMMO BI INCL CAD: CPT | Mod: TC

## 2024-09-27 ENCOUNTER — HOSPITAL ENCOUNTER (OUTPATIENT)
Dept: RADIOLOGY | Facility: OTHER | Age: 59
Discharge: HOME | End: 2024-09-27
Attending: INTERNAL MEDICINE
Payer: MEDICARE

## 2024-09-27 DIAGNOSIS — R10.9 FLANK PAIN: ICD-10-CM

## 2024-09-27 PROCEDURE — 74176 CT ABD & PELVIS W/O CONTRAST: CPT | Mod: TC

## 2024-09-27 PROCEDURE — 74176 CT ABD & PELVIS W/O CONTRAST: CPT | Mod: 26,,, | Performed by: RADIOLOGY

## 2025-07-08 ENCOUNTER — TELEPHONE (OUTPATIENT)
Dept: PAIN MEDICINE | Facility: CLINIC | Age: 60
End: 2025-07-08
Payer: MEDICARE

## 2025-07-08 NOTE — TELEPHONE ENCOUNTER
Copied from CRM #[id number]. Topic: Appointments - Appointment Access  >> Jul 3, 2025 11:20 AM Teresita wrote:  Caller nik to see if referral rcvd on PT was sent apprx 1 week ago. Please check and advise    Contact [phone]